# Patient Record
Sex: FEMALE | Race: WHITE | NOT HISPANIC OR LATINO | Employment: PART TIME | URBAN - METROPOLITAN AREA
[De-identification: names, ages, dates, MRNs, and addresses within clinical notes are randomized per-mention and may not be internally consistent; named-entity substitution may affect disease eponyms.]

---

## 2020-01-17 ENCOUNTER — TRANSCRIBE ORDERS (OUTPATIENT)
Dept: ADMINISTRATIVE | Facility: HOSPITAL | Age: 40
End: 2020-01-17

## 2020-01-17 DIAGNOSIS — E04.9 ENLARGEMENT OF THYROID: Primary | ICD-10-CM

## 2020-01-20 ENCOUNTER — HOSPITAL ENCOUNTER (OUTPATIENT)
Dept: RADIOLOGY | Facility: HOSPITAL | Age: 40
Discharge: HOME | End: 2020-01-20
Payer: COMMERCIAL

## 2020-01-20 DIAGNOSIS — E04.9 ENLARGEMENT OF THYROID: ICD-10-CM

## 2020-01-20 PROCEDURE — 76536 US EXAM OF HEAD AND NECK: CPT

## 2021-03-30 DIAGNOSIS — Z23 ENCOUNTER FOR IMMUNIZATION: ICD-10-CM

## 2021-04-08 ENCOUNTER — IMMUNIZATIONS (OUTPATIENT)
Dept: FAMILY MEDICINE CLINIC | Facility: HOSPITAL | Age: 41
End: 2021-04-08

## 2021-04-08 DIAGNOSIS — Z23 ENCOUNTER FOR IMMUNIZATION: Primary | ICD-10-CM

## 2021-04-08 PROCEDURE — 0001A SARS-COV-2 / COVID-19 MRNA VACCINE (PFIZER-BIONTECH) 30 MCG: CPT

## 2021-04-08 PROCEDURE — 91300 SARS-COV-2 / COVID-19 MRNA VACCINE (PFIZER-BIONTECH) 30 MCG: CPT

## 2021-04-09 ENCOUNTER — TRANSCRIBE ORDERS (OUTPATIENT)
Dept: ADMINISTRATIVE | Facility: HOSPITAL | Age: 41
End: 2021-04-09

## 2021-04-09 DIAGNOSIS — R10.84 GENERALIZED ABDOMINAL PAIN: Primary | ICD-10-CM

## 2021-04-15 ENCOUNTER — HOSPITAL ENCOUNTER (OUTPATIENT)
Dept: CT IMAGING | Facility: CLINIC | Age: 41
Discharge: HOME | End: 2021-04-15
Payer: COMMERCIAL

## 2021-04-15 DIAGNOSIS — R10.84 GENERALIZED ABDOMINAL PAIN: ICD-10-CM

## 2021-04-15 PROCEDURE — 74176 CT ABD & PELVIS W/O CONTRAST: CPT

## 2021-04-15 PROCEDURE — G1004 CDSM NDSC: HCPCS

## 2021-04-30 ENCOUNTER — IMMUNIZATIONS (OUTPATIENT)
Dept: FAMILY MEDICINE CLINIC | Facility: HOSPITAL | Age: 41
End: 2021-04-30

## 2021-04-30 DIAGNOSIS — Z23 ENCOUNTER FOR IMMUNIZATION: Primary | ICD-10-CM

## 2021-04-30 PROCEDURE — 0002A SARS-COV-2 / COVID-19 MRNA VACCINE (PFIZER-BIONTECH) 30 MCG: CPT

## 2021-04-30 PROCEDURE — 91300 SARS-COV-2 / COVID-19 MRNA VACCINE (PFIZER-BIONTECH) 30 MCG: CPT

## 2022-03-28 ENCOUNTER — OFFICE VISIT (OUTPATIENT)
Dept: OBGYN CLINIC | Facility: CLINIC | Age: 42
End: 2022-03-28
Payer: COMMERCIAL

## 2022-03-28 VITALS
SYSTOLIC BLOOD PRESSURE: 120 MMHG | WEIGHT: 136 LBS | BODY MASS INDEX: 24.1 KG/M2 | DIASTOLIC BLOOD PRESSURE: 72 MMHG | HEIGHT: 63 IN

## 2022-03-28 DIAGNOSIS — N92.0 EXCESSIVE OR FREQUENT MENSTRUATION: ICD-10-CM

## 2022-03-28 DIAGNOSIS — Z12.31 ENCOUNTER FOR SCREENING MAMMOGRAM FOR MALIGNANT NEOPLASM OF BREAST: ICD-10-CM

## 2022-03-28 DIAGNOSIS — Z12.4 SCREENING FOR MALIGNANT NEOPLASM OF THE CERVIX: Primary | ICD-10-CM

## 2022-03-28 PROCEDURE — G0476 HPV COMBO ASSAY CA SCREEN: HCPCS | Performed by: OBSTETRICS & GYNECOLOGY

## 2022-03-28 PROCEDURE — G0145 SCR C/V CYTO,THINLAYER,RESCR: HCPCS | Performed by: OBSTETRICS & GYNECOLOGY

## 2022-03-28 PROCEDURE — 99386 PREV VISIT NEW AGE 40-64: CPT | Performed by: OBSTETRICS & GYNECOLOGY

## 2022-03-28 RX ORDER — TRANEXAMIC ACID 650 1/1
650 TABLET ORAL 3 TIMES DAILY PRN
Qty: 30 TABLET | Refills: 3 | Status: SHIPPED | OUTPATIENT
Start: 2022-03-28 | End: 2022-07-21

## 2022-03-28 RX ORDER — CETIRIZINE HYDROCHLORIDE 10 MG/1
10 TABLET ORAL EVERY MORNING
COMMUNITY

## 2022-03-28 RX ORDER — GABAPENTIN 300 MG/1
300 CAPSULE ORAL 2 TIMES DAILY
COMMUNITY

## 2022-03-28 RX ORDER — RAMIPRIL 2.5 MG/1
2.5 CAPSULE ORAL EVERY MORNING
COMMUNITY
Start: 2022-03-15

## 2022-03-28 NOTE — PROGRESS NOTES
Assessment/Plan:         Diagnoses and all orders for this visit:    Screening for malignant neoplasm of the cervix  -     Liquid-based pap, screening    Encounter for screening mammogram for malignant neoplasm of breast  -     Mammo screening bilateral w 3d & cad; Future    Excessive or frequent menstruation  -     US pelvis complete non OB; Future          Subjective:      Patient ID: Flor Gordon is a 37 y o  female  The patient is a 60-year-old  3 para 1 female who presents for annual exam and evaluation of increasingly severe menorrhagia  She has flooding type bleeding, soaking her clothes, and spontaneously expelling tampons  Her periods last month was longer than 10 days  She does not have [address]  She does have dysmenorrhea particularly associated with clots  She has completed her childbearing at this point and her  has had a vasectomy  She has no history of abnormal Pap smears but has not had a Pap smear in years  Her examination was normal but we will send her for an ultrasound  We also will give her a prescription of tranexamic acid until the testing is done and we can re-evaluate her options  Her other major medical problem is diabetes, which is well controlled  She was given a prescription for mammogram, and will obtain the ultrasound of the pelvis  The following portions of the patient's history were reviewed and updated as appropriate: allergies, current medications, past family history, past medical history, past social history, past surgical history and problem list     Review of Systems   Constitutional: Negative for chills, diaphoresis, fatigue, fever and unexpected weight change  HENT: Negative for congestion, sinus pressure, sinus pain, tinnitus and trouble swallowing  Eyes: Negative for visual disturbance  Respiratory: Negative for cough, chest tightness and shortness of breath      Cardiovascular: Negative for chest pain, palpitations and leg swelling  Gastrointestinal: Negative for abdominal distention, abdominal pain, anal bleeding, constipation, diarrhea, nausea, rectal pain and vomiting  Endocrine: Negative for heat intolerance  Genitourinary: Negative for difficulty urinating, dysuria, flank pain, frequency, genital sores, hematuria and urgency  Musculoskeletal: Negative for arthralgias, back pain and joint swelling  Skin: Negative for rash  Allergic/Immunologic: Negative for environmental allergies and food allergies  Neurological: Negative for headaches  Hematological: Negative for adenopathy  Does not bruise/bleed easily  Psychiatric/Behavioral: Negative for decreased concentration and dysphoric mood  The patient is not nervous/anxious            Objective:      Ht 5' 3" (1 6 m)   Wt 61 7 kg (136 lb)   LMP 03/14/2022   BMI 24 09 kg/m²          Physical Exam

## 2022-03-30 LAB
HPV HR 12 DNA CVX QL NAA+PROBE: NEGATIVE
HPV16 DNA CVX QL NAA+PROBE: NEGATIVE
HPV18 DNA CVX QL NAA+PROBE: NEGATIVE

## 2022-04-06 LAB
LAB AP GYN PRIMARY INTERPRETATION: NORMAL
Lab: NORMAL
PATH INTERP SPEC-IMP: NORMAL

## 2022-04-08 ENCOUNTER — HOSPITAL ENCOUNTER (OUTPATIENT)
Dept: RADIOLOGY | Facility: HOSPITAL | Age: 42
Discharge: HOME | End: 2022-04-08
Payer: COMMERCIAL

## 2022-04-08 DIAGNOSIS — N92.0 EXCESSIVE OR FREQUENT MENSTRUATION: ICD-10-CM

## 2022-04-08 PROCEDURE — 76830 TRANSVAGINAL US NON-OB: CPT

## 2022-04-08 PROCEDURE — 76856 US EXAM PELVIC COMPLETE: CPT

## 2022-04-18 ENCOUNTER — OFFICE VISIT (OUTPATIENT)
Dept: OBGYN CLINIC | Facility: CLINIC | Age: 42
End: 2022-04-18
Payer: COMMERCIAL

## 2022-04-18 VITALS
WEIGHT: 139 LBS | HEIGHT: 63 IN | BODY MASS INDEX: 24.63 KG/M2 | DIASTOLIC BLOOD PRESSURE: 72 MMHG | SYSTOLIC BLOOD PRESSURE: 118 MMHG

## 2022-04-18 DIAGNOSIS — N92.0 MENORRHAGIA WITH REGULAR CYCLE: Primary | ICD-10-CM

## 2022-04-18 PROCEDURE — 99214 OFFICE O/P EST MOD 30 MIN: CPT | Performed by: OBSTETRICS & GYNECOLOGY

## 2022-04-18 NOTE — PROGRESS NOTES
Assessment/Plan:         Diagnoses and all orders for this visit:    Menorrhagia with regular cycle          Subjective:      Patient ID: Galen Lam is a 37 y o  female  Patient is a 45-year-old  3 para 3 female with increasingly severe menorrhagia  She has no intermenstrual bleeding but has flooding type bleeding with her periods  She frequently bleeds through any type of protection and the exact timing of the heavy this of the periods is unpredictable  It could be in the beginning or the and  She has never bled between periods  A recent ultrasound showed possible polyp in the endometrial canal   The bleeding pattern that she has had does not correspond to a polyp  The remainder of the ultrasound was normal   Option risks and benefits were discussed  She is requesting hysterectomy  Another alternative would be endometrial ablation and hysteroscopic polypectomy  She is concerned that the endometrial ablation would not necessarily last until menopause in her particular case  She does not desire future childbearing  Her  had a vasectomy 13 years ago  I agree with her decision to proceed with hysterectomy  It could be performed laparoscopically  In terms of previous surgery, she has only had a cholecystectomy  We will proceed with scheduling the surgery  Her Pap smear was normal several weeks ago  The following portions of the patient's history were reviewed and updated as appropriate: allergies, current medications, past family history, past medical history, past social history, past surgical history and problem list     Review of Systems   Constitutional: Negative for chills, diaphoresis, fatigue, fever and unexpected weight change  HENT: Negative for congestion, sinus pressure, sinus pain, tinnitus and trouble swallowing  Eyes: Negative for visual disturbance  Respiratory: Negative for cough, chest tightness and shortness of breath      Cardiovascular: Negative for chest pain, palpitations and leg swelling  Gastrointestinal: Negative for abdominal distention, abdominal pain, anal bleeding, constipation, diarrhea, nausea, rectal pain and vomiting  Endocrine: Negative for heat intolerance  Genitourinary: Negative for difficulty urinating, dysuria, flank pain, frequency, genital sores, hematuria and urgency  Musculoskeletal: Negative for arthralgias, back pain and joint swelling  Skin: Negative for rash  Allergic/Immunologic: Negative for environmental allergies and food allergies  Neurological: Negative for headaches  Hematological: Negative for adenopathy  Does not bruise/bleed easily  Psychiatric/Behavioral: Negative for decreased concentration and dysphoric mood  The patient is not nervous/anxious  Objective:      /72 (BP Location: Left arm)   Ht 5' 3" (1 6 m)   Wt 63 kg (139 lb)   LMP 04/17/2022   BMI 24 62 kg/m²          Physical Exam  Vitals and nursing note reviewed  Exam conducted with a chaperone present  Constitutional:       Appearance: Normal appearance  She is normal weight  HENT:      Head: Normocephalic and atraumatic  Nose: Nose normal    Eyes:      Conjunctiva/sclera: Conjunctivae normal    Pulmonary:      Effort: Pulmonary effort is normal    Abdominal:      General: Abdomen is flat  Palpations: Abdomen is soft  Musculoskeletal:         General: Normal range of motion  Skin:     General: Skin is warm and dry  Neurological:      General: No focal deficit present  Mental Status: She is alert  Mental status is at baseline  Psychiatric:         Mood and Affect: Mood normal          Behavior: Behavior normal          Thought Content:  Thought content normal          Judgment: Judgment normal

## 2022-04-29 ENCOUNTER — HOSPITAL ENCOUNTER (OUTPATIENT)
Dept: RADIOLOGY | Facility: HOSPITAL | Age: 42
Discharge: HOME | End: 2022-04-29
Payer: COMMERCIAL

## 2022-04-29 VITALS — WEIGHT: 135 LBS | BODY MASS INDEX: 23.92 KG/M2 | HEIGHT: 63 IN

## 2022-04-29 DIAGNOSIS — Z12.31 ENCOUNTER FOR SCREENING MAMMOGRAM FOR MALIGNANT NEOPLASM OF BREAST: ICD-10-CM

## 2022-04-29 PROCEDURE — 77067 SCR MAMMO BI INCL CAD: CPT

## 2022-04-29 PROCEDURE — 77063 BREAST TOMOSYNTHESIS BI: CPT

## 2022-05-04 DIAGNOSIS — R92.8 ABNORMAL MAMMOGRAM OF LEFT BREAST: Primary | ICD-10-CM

## 2022-05-12 ENCOUNTER — HOSPITAL ENCOUNTER (OUTPATIENT)
Dept: RADIOLOGY | Facility: HOSPITAL | Age: 42
Discharge: HOME | End: 2022-05-12
Payer: COMMERCIAL

## 2022-05-12 DIAGNOSIS — R92.8 ABNORMAL MAMMOGRAM OF LEFT BREAST: ICD-10-CM

## 2022-05-12 DIAGNOSIS — E11.65 UNCONTROLLED TYPE 2 DIABETES MELLITUS WITH HYPERGLYCEMIA (HCC): Primary | ICD-10-CM

## 2022-05-12 PROCEDURE — 76642 ULTRASOUND BREAST LIMITED: CPT

## 2022-05-23 ENCOUNTER — HOSPITAL ENCOUNTER (OUTPATIENT)
Dept: RADIOLOGY | Facility: HOSPITAL | Age: 42
Discharge: HOME | End: 2022-05-23

## 2022-05-23 ENCOUNTER — HOSPITAL ENCOUNTER (OUTPATIENT)
Dept: RADIOLOGY | Facility: HOSPITAL | Age: 42
Discharge: HOME | End: 2022-05-23
Payer: COMMERCIAL

## 2022-05-23 ENCOUNTER — OFFICE VISIT (OUTPATIENT)
Dept: LAB | Facility: HOSPITAL | Age: 42
End: 2022-05-23
Payer: COMMERCIAL

## 2022-05-23 VITALS — DIASTOLIC BLOOD PRESSURE: 76 MMHG | SYSTOLIC BLOOD PRESSURE: 128 MMHG

## 2022-05-23 DIAGNOSIS — R92.8 ABNORMAL FINDINGS ON DIAGNOSTIC IMAGING OF BREAST: ICD-10-CM

## 2022-05-23 DIAGNOSIS — Z01.818 PRE-OP EXAM: ICD-10-CM

## 2022-05-23 LAB
ATRIAL RATE: 90 BPM
P AXIS: 70 DEGREES
PR INTERVAL: 128 MS
QRS AXIS: 59 DEGREES
QRSD INTERVAL: 70 MS
QT INTERVAL: 376 MS
QTC INTERVAL: 459 MS
T WAVE AXIS: 37 DEGREES
VENTRICULAR RATE: 90 BPM

## 2022-05-23 PROCEDURE — 88305 TISSUE EXAM BY PATHOLOGIST: CPT | Performed by: PATHOLOGY

## 2022-05-23 PROCEDURE — 93005 ELECTROCARDIOGRAM TRACING: CPT

## 2022-05-23 PROCEDURE — 19083 BX BREAST 1ST LESION US IMAG: CPT

## 2022-05-23 PROCEDURE — 93010 ELECTROCARDIOGRAM REPORT: CPT | Performed by: INTERNAL MEDICINE

## 2022-05-23 PROCEDURE — A4648 IMPLANTABLE TISSUE MARKER: HCPCS

## 2022-05-23 RX ORDER — LIDOCAINE HYDROCHLORIDE 10 MG/ML
5 INJECTION, SOLUTION EPIDURAL; INFILTRATION; INTRACAUDAL; PERINEURAL ONCE
Status: COMPLETED | OUTPATIENT
Start: 2022-05-23 | End: 2022-05-23

## 2022-05-23 RX ADMIN — LIDOCAINE HYDROCHLORIDE 3 ML: 10 INJECTION, SOLUTION EPIDURAL; INFILTRATION; INTRACAUDAL; PERINEURAL at 08:24

## 2022-05-23 NOTE — DISCHARGE INSTR - OTHER ORDERS
POST LARGE CORE BREAST BIOPSY PATIENT INFORMATION      Place an ice pack inside your bra over the top of the dressing every hour for 20 minutes (20 minutes on, 60 minutes off)  Do this until bedtime  Do not shower or bathe until the following morning  You may bathe your breast carefully with the steri-strips in place  Be careful    Not to loosen them  The steri-strips will fall off in 3-5 days  You may have mild discomfort, and you may have some bruising where the   Needle entered the skin  This should clear within 5-7 days  If you need medicine for discomfort, take acetaminophen products such as   Tylenol  You may also take Advil or Motrin products  Do not participate in strenuous activities such as-tennis, aerobics, skiing,  Weight lifting, etc  for 24 hours  Refrain from swimming/soaking for 72 hours  Wearing a bra for sleeping may be more comfortable for the first 24-48 hours  Watch for continued bleeding, pain or fever over 101  If any of these symptoms occur, please contact our    breast nurse navigator at the location where your biopsy was performed  During normal business hours (7:30 am-4:00 pm) please call the nurse navigator at the site where your   procedure was performed:    [address] [facility] [address]: [phone] or   [address]: [phone] or [phone]  [address]: [address] [place]: [address]: [phone]              After 4 PM - please call your physician or go to the nearest Emergency Department location  9          The final results of your biopsy are usually available within one week

## 2022-05-23 NOTE — PROGRESS NOTES
Patient arrived via:    __x___ ambulatory    _____ wheelchair    _____ stretcher      Domestic violence screen    __x___ negative _____ positive      Breast Implants:    ______ yes ___x___ no

## 2022-05-23 NOTE — PROGRESS NOTES
Procedure type:    __x___ Ultrasound guided _____ Stereotactic    Breast:    ___x__ Left breast biopsy  _____ Right breast biopsy    Time-out called @ 08:21 and procedure confirmed with patient Zoraida Smyth, myself Juma Cavazos RN, Ca Guillory MD, and [address] [facility]      Location: Left Breast 3 o'clock retroareolar    Needle: 12G janett    # of passes: 2    Clip: Wing biopsy marker    Performed by: Ca Guillory MD    Pressure held for 5 minutes by: Juma Cavazos RN    Steri Strips:    __x___ yes _____ no    Band aid:    _____ yes __x___ no   Gauze and tape in place    Tolerated procedure:    __x___ yes _____ no

## 2022-05-24 ENCOUNTER — TELEPHONE (OUTPATIENT)
Dept: RADIOLOGY | Facility: HOSPITAL | Age: 42
End: 2022-05-24

## 2022-05-25 ENCOUNTER — TELEPHONE (OUTPATIENT)
Dept: RADIOLOGY | Facility: HOSPITAL | Age: 42
End: 2022-05-25

## 2022-05-26 ENCOUNTER — ANESTHESIA EVENT (OUTPATIENT)
Dept: PERIOP | Facility: HOSPITAL | Age: 42
End: 2022-05-26
Payer: COMMERCIAL

## 2022-06-01 ENCOUNTER — APPOINTMENT (OUTPATIENT)
Dept: LAB | Facility: CLINIC | Age: 42
End: 2022-06-01
Payer: COMMERCIAL

## 2022-06-01 ENCOUNTER — APPOINTMENT (OUTPATIENT)
Dept: LAB | Facility: HOSPITAL | Age: 42
End: 2022-06-01
Payer: COMMERCIAL

## 2022-06-01 DIAGNOSIS — Z01.818 PRE-OP EXAM: ICD-10-CM

## 2022-06-01 DIAGNOSIS — N92.0 EXCESSIVE OR FREQUENT MENSTRUATION: Primary | ICD-10-CM

## 2022-06-01 PROCEDURE — 86901 BLOOD TYPING SEROLOGIC RH(D): CPT

## 2022-06-01 PROCEDURE — 86900 BLOOD TYPING SEROLOGIC ABO: CPT

## 2022-06-01 PROCEDURE — 86850 RBC ANTIBODY SCREEN: CPT

## 2022-06-01 PROCEDURE — 36415 COLL VENOUS BLD VENIPUNCTURE: CPT

## 2022-06-01 RX ORDER — FLUTICASONE PROPIONATE 50 MCG
1 SPRAY, SUSPENSION (ML) NASAL AS NEEDED
COMMUNITY

## 2022-06-01 NOTE — PRE-PROCEDURE INSTRUCTIONS
Pre-Surgery Instructions:   Medication Instructions    cetirizine (ZyrTEC) 10 mg tablet Uses PRN- OK to take day of surgery    fluticasone (FLONASE) 50 mcg/act nasal spray Uses PRN- OK to take day of surgery    gabapentin (NEURONTIN) 300 mg capsule Take day of surgery   Insulin Glargine (BASAGLAR KWIKPEN SC) Hold day of surgery   ramipril (ALTACE) 2 5 mg capsule Hold day of surgery   sertraline (ZOLOFT) 50 mg tablet Take day of surgery   Tranexamic Acid 650 MG TABS Uses PRN- DO NOT take day of surgery   Pre op and bathing instructions reviewed  Pt has hibiclens  Pt  Verbalized understanding of current visitor restrictions  Pt  Verbalized an understanding of all instructions reviewed and offers no concerns at this time  Instructed to avoid all ASA/NSAIDs and OTC Vit/Supp from now until after surgery per anesthesia guidelines   Tylenol ok prn  DOS meds with a few sips of H2O

## 2022-06-02 LAB
ABO GROUP BLD: NORMAL
BLD GP AB SCN SERPL QL: NEGATIVE
RH BLD: POSITIVE
SPECIMEN EXPIRATION DATE: NORMAL

## 2022-06-06 ENCOUNTER — ANESTHESIA (OUTPATIENT)
Dept: PERIOP | Facility: HOSPITAL | Age: 42
End: 2022-06-06
Payer: COMMERCIAL

## 2022-06-08 DIAGNOSIS — Z01.818 PRE-OP TESTING: Primary | ICD-10-CM

## 2022-07-07 PROCEDURE — NC001 PR NO CHARGE: Performed by: OBSTETRICS & GYNECOLOGY

## 2022-07-07 NOTE — H&P
Patient is a 72-year-old  3 para 3 female with increasingly severe menorrhagia  She has no intermenstrual bleeding but has flooding type bleeding with her periods  She frequently bleeds through any type of protection and the exact timing of the heavy this of the periods is unpredictable  It could be in the beginning or the and  She has never bled between periods  A recent ultrasound showed possible polyp in the endometrial canal   The bleeding pattern that she has had does not correspond to a polyp  The remainder of the ultrasound was normal   Option risks and benefits were discussed  She is requesting hysterectomy  Another alternative would be endometrial ablation and hysteroscopic polypectomy  She is concerned that the endometrial ablation would not necessarily last until menopause in her particular case  She does not desire future childbearing  Her  had a vasectomy 13 years ago  I agree with her decision to proceed with hysterectomy  It could be performed laparoscopically  In terms of previous surgery, she has only had a cholecystectomy  We will proceed with scheduling the surgery  Her Pap smear was normal several weeks ago  The following portions of the patient's history were reviewed and updated as appropriate: allergies, current medications, past family history, past medical history, past social history, past surgical history and problem list     Objective:        /72 (BP Location: Left arm)   Ht 5' 3" (1 6 m)   Wt 63 kg (139 lb)   LMP 2022   BMI 24 62 kg/m²             Physical Exam  Vitals and nursing note reviewed  Exam conducted with a chaperone present  Constitutional:       Appearance: Normal appearance  She is normal weight  HENT:      Head: Normocephalic and atraumatic        Nose: Nose normal    Eyes:      Conjunctiva/sclera: Conjunctivae normal    Pulmonary:      Effort: Pulmonary effort is normal  Chest clear to a and p  Car - RRR, no murmur  Abdominal:      General: Abdomen is flat  Palpations: Abdomen is soft  Musculoskeletal:         General: Normal range of motion  Skin:     General: Skin is warm and dry  Neurological:      General: No focal deficit present  Mental Status: She is alert  Mental status is at baseline  Psychiatric:         Mood and Affect: Mood normal          Behavior: Behavior normal          Thought Content:  Thought content normal          Judgment: Judgment normal

## 2022-07-10 PROBLEM — E10.9 DIABETES MELLITUS TYPE 1 (HCC): Status: ACTIVE | Noted: 2022-07-10

## 2022-07-10 PROBLEM — I10 HTN (HYPERTENSION): Status: ACTIVE | Noted: 2022-07-10

## 2022-07-10 NOTE — ANESTHESIA PREPROCEDURE EVALUATION
Procedure:  HYSTERECTOMY LAPAROSCOPIC ASSISTED VAGINAL (LAVH) (N/A Abdomen)    Relevant Problems   CARDIO   (+) HTN (hypertension)      ENDO   (+) Diabetes mellitus type 1 ([facility] [address] )      COVID+ in mid May 2022:  +smoker  +1-4 alcoholic drinks a day  Physical Exam    Airway    Mallampati score: II  TM Distance: >3 FB  Neck ROM: full     Dental       Cardiovascular      Pulmonary      Other Findings        EKG (5/23/22)  Normal sinus rhythm  Normal ECG  No previous ECGs available    Anesthesia Plan  ASA Score- 2     Anesthesia Type- general with ASA Monitors  Additional Monitors:   Airway Plan: ETT  Comment: NPO after: MN    Urine hcg = negative    7 units of insulin (regular) IV for blood sugar of 261  Plan Factors-Exercise tolerance (METS): >4 METS  Chart reviewed  EKG reviewed  Patient summary reviewed  Patient is a current smoker  Patient instructed to abstain from smoking on day of procedure  Patient smoked on day of surgery  Induction- intravenous  Postoperative Plan- Plan for postoperative opioid use  Planned trial extubation    Informed Consent- Anesthetic plan and risks discussed with patient and spouse  I personally reviewed this patient with the CRNA  Discussed and agreed on the Anesthesia Plan with the MONSERRAT Singh

## 2022-07-11 ENCOUNTER — HOSPITAL ENCOUNTER (OUTPATIENT)
Facility: HOSPITAL | Age: 42
Setting detail: OUTPATIENT SURGERY
Discharge: HOME | End: 2022-07-11
Attending: OBSTETRICS & GYNECOLOGY | Admitting: OBSTETRICS & GYNECOLOGY
Payer: COMMERCIAL

## 2022-07-11 VITALS
TEMPERATURE: 98 F | OXYGEN SATURATION: 99 % | BODY MASS INDEX: 24.27 KG/M2 | SYSTOLIC BLOOD PRESSURE: 124 MMHG | WEIGHT: 137 LBS | RESPIRATION RATE: 16 BRPM | HEART RATE: 84 BPM | HEIGHT: 63 IN | DIASTOLIC BLOOD PRESSURE: 60 MMHG

## 2022-07-11 DIAGNOSIS — N92.0 EXCESSIVE OR FREQUENT MENSTRUATION: ICD-10-CM

## 2022-07-11 DIAGNOSIS — Z90.710 S/P LAPAROSCOPIC ASSISTED VAGINAL HYSTERECTOMY (LAVH): Primary | ICD-10-CM

## 2022-07-11 LAB
ABO GROUP BLD: NORMAL
BLD GP AB SCN SERPL QL: NEGATIVE
EXT PREGNANCY TEST URINE: NEGATIVE
EXT. CONTROL: NORMAL
GLUCOSE SERPL-MCNC: 205 MG/DL (ref 65–140)
GLUCOSE SERPL-MCNC: 261 MG/DL (ref 65–140)
RH BLD: POSITIVE
SPECIMEN EXPIRATION DATE: NORMAL

## 2022-07-11 PROCEDURE — 86850 RBC ANTIBODY SCREEN: CPT | Performed by: OBSTETRICS & GYNECOLOGY

## 2022-07-11 PROCEDURE — 82948 REAGENT STRIP/BLOOD GLUCOSE: CPT

## 2022-07-11 PROCEDURE — 86900 BLOOD TYPING SEROLOGIC ABO: CPT | Performed by: OBSTETRICS & GYNECOLOGY

## 2022-07-11 PROCEDURE — 58552 LAPARO-VAG HYST INCL T/O: CPT | Performed by: OBSTETRICS & GYNECOLOGY

## 2022-07-11 PROCEDURE — 81025 URINE PREGNANCY TEST: CPT | Performed by: OBSTETRICS & GYNECOLOGY

## 2022-07-11 PROCEDURE — 86901 BLOOD TYPING SEROLOGIC RH(D): CPT | Performed by: OBSTETRICS & GYNECOLOGY

## 2022-07-11 PROCEDURE — 88307 TISSUE EXAM BY PATHOLOGIST: CPT | Performed by: PATHOLOGY

## 2022-07-11 RX ORDER — DEXAMETHASONE SODIUM PHOSPHATE 10 MG/ML
INJECTION, SOLUTION INTRAMUSCULAR; INTRAVENOUS AS NEEDED
Status: DISCONTINUED | OUTPATIENT
Start: 2022-07-11 | End: 2022-07-11

## 2022-07-11 RX ORDER — PROPOFOL 10 MG/ML
INJECTION, EMULSION INTRAVENOUS CONTINUOUS PRN
Status: DISCONTINUED | OUTPATIENT
Start: 2022-07-11 | End: 2022-07-11

## 2022-07-11 RX ORDER — MIDAZOLAM HYDROCHLORIDE 2 MG/2ML
INJECTION, SOLUTION INTRAMUSCULAR; INTRAVENOUS AS NEEDED
Status: DISCONTINUED | OUTPATIENT
Start: 2022-07-11 | End: 2022-07-11

## 2022-07-11 RX ORDER — SODIUM CHLORIDE 9 MG/ML
INJECTION, SOLUTION INTRAVENOUS CONTINUOUS PRN
Status: DISCONTINUED | OUTPATIENT
Start: 2022-07-11 | End: 2022-07-11

## 2022-07-11 RX ORDER — FENTANYL CITRATE/PF 50 MCG/ML
25 SYRINGE (ML) INJECTION
Status: DISCONTINUED | OUTPATIENT
Start: 2022-07-11 | End: 2022-07-11 | Stop reason: HOSPADM

## 2022-07-11 RX ORDER — BUPIVACAINE HYDROCHLORIDE 2.5 MG/ML
INJECTION, SOLUTION EPIDURAL; INFILTRATION; INTRACAUDAL AS NEEDED
Status: DISCONTINUED | OUTPATIENT
Start: 2022-07-11 | End: 2022-07-11 | Stop reason: HOSPADM

## 2022-07-11 RX ORDER — ROCURONIUM BROMIDE 10 MG/ML
INJECTION, SOLUTION INTRAVENOUS AS NEEDED
Status: DISCONTINUED | OUTPATIENT
Start: 2022-07-11 | End: 2022-07-11

## 2022-07-11 RX ORDER — HYDROMORPHONE HCL 110MG/55ML
PATIENT CONTROLLED ANALGESIA SYRINGE INTRAVENOUS AS NEEDED
Status: DISCONTINUED | OUTPATIENT
Start: 2022-07-11 | End: 2022-07-11

## 2022-07-11 RX ORDER — HYDROMORPHONE HCL/PF 1 MG/ML
0.2 SYRINGE (ML) INJECTION
Status: DISCONTINUED | OUTPATIENT
Start: 2022-07-11 | End: 2022-07-11 | Stop reason: HOSPADM

## 2022-07-11 RX ORDER — NEOSTIGMINE METHYLSULFATE 1 MG/ML
INJECTION INTRAVENOUS AS NEEDED
Status: DISCONTINUED | OUTPATIENT
Start: 2022-07-11 | End: 2022-07-11

## 2022-07-11 RX ORDER — METOCLOPRAMIDE HYDROCHLORIDE 5 MG/ML
10 INJECTION INTRAMUSCULAR; INTRAVENOUS ONCE AS NEEDED
Status: DISCONTINUED | OUTPATIENT
Start: 2022-07-11 | End: 2022-07-11 | Stop reason: HOSPADM

## 2022-07-11 RX ORDER — GLYCOPYRROLATE 0.2 MG/ML
INJECTION INTRAMUSCULAR; INTRAVENOUS AS NEEDED
Status: DISCONTINUED | OUTPATIENT
Start: 2022-07-11 | End: 2022-07-11

## 2022-07-11 RX ORDER — LIDOCAINE HYDROCHLORIDE 10 MG/ML
INJECTION, SOLUTION EPIDURAL; INFILTRATION; INTRACAUDAL; PERINEURAL AS NEEDED
Status: DISCONTINUED | OUTPATIENT
Start: 2022-07-11 | End: 2022-07-11

## 2022-07-11 RX ORDER — EPHEDRINE SULFATE 50 MG/ML
INJECTION INTRAVENOUS AS NEEDED
Status: DISCONTINUED | OUTPATIENT
Start: 2022-07-11 | End: 2022-07-11

## 2022-07-11 RX ORDER — FENTANYL CITRATE 50 UG/ML
INJECTION, SOLUTION INTRAMUSCULAR; INTRAVENOUS AS NEEDED
Status: DISCONTINUED | OUTPATIENT
Start: 2022-07-11 | End: 2022-07-11

## 2022-07-11 RX ORDER — ONDANSETRON 2 MG/ML
4 INJECTION INTRAMUSCULAR; INTRAVENOUS ONCE AS NEEDED
Status: DISCONTINUED | OUTPATIENT
Start: 2022-07-11 | End: 2022-07-11 | Stop reason: HOSPADM

## 2022-07-11 RX ORDER — SODIUM CHLORIDE, SODIUM LACTATE, POTASSIUM CHLORIDE, CALCIUM CHLORIDE 600; 310; 30; 20 MG/100ML; MG/100ML; MG/100ML; MG/100ML
INJECTION, SOLUTION INTRAVENOUS CONTINUOUS PRN
Status: DISCONTINUED | OUTPATIENT
Start: 2022-07-11 | End: 2022-07-11

## 2022-07-11 RX ORDER — ONDANSETRON 2 MG/ML
INJECTION INTRAMUSCULAR; INTRAVENOUS AS NEEDED
Status: DISCONTINUED | OUTPATIENT
Start: 2022-07-11 | End: 2022-07-11

## 2022-07-11 RX ORDER — CEFAZOLIN SODIUM 1 G/3ML
INJECTION, POWDER, FOR SOLUTION INTRAMUSCULAR; INTRAVENOUS AS NEEDED
Status: DISCONTINUED | OUTPATIENT
Start: 2022-07-11 | End: 2022-07-11

## 2022-07-11 RX ORDER — DEXTROSE MONOHYDRATE 25 G/50ML
INJECTION, SOLUTION INTRAVENOUS AS NEEDED
Status: DISCONTINUED | OUTPATIENT
Start: 2022-07-11 | End: 2022-07-11

## 2022-07-11 RX ORDER — SODIUM CHLORIDE, SODIUM LACTATE, POTASSIUM CHLORIDE, CALCIUM CHLORIDE 600; 310; 30; 20 MG/100ML; MG/100ML; MG/100ML; MG/100ML
75 INJECTION, SOLUTION INTRAVENOUS CONTINUOUS
Status: DISCONTINUED | OUTPATIENT
Start: 2022-07-11 | End: 2022-07-21 | Stop reason: HOSPADM

## 2022-07-11 RX ORDER — OXYCODONE HYDROCHLORIDE AND ACETAMINOPHEN 5; 325 MG/1; MG/1
1 TABLET ORAL EVERY 4 HOURS PRN
Qty: 30 TABLET | Refills: 0 | Status: SHIPPED | OUTPATIENT
Start: 2022-07-11 | End: 2022-07-21

## 2022-07-11 RX ORDER — IBUPROFEN 600 MG/1
600 TABLET ORAL EVERY 6 HOURS PRN
Qty: 30 TABLET | Refills: 0 | Status: SHIPPED | OUTPATIENT
Start: 2022-07-11 | End: 2022-07-18

## 2022-07-11 RX ORDER — PROPOFOL 10 MG/ML
INJECTION, EMULSION INTRAVENOUS AS NEEDED
Status: DISCONTINUED | OUTPATIENT
Start: 2022-07-11 | End: 2022-07-11

## 2022-07-11 RX ORDER — ALBUTEROL SULFATE 2.5 MG/3ML
2.5 SOLUTION RESPIRATORY (INHALATION) ONCE AS NEEDED
Status: DISCONTINUED | OUTPATIENT
Start: 2022-07-11 | End: 2022-07-11 | Stop reason: HOSPADM

## 2022-07-11 RX ADMIN — LIDOCAINE HYDROCHLORIDE 50 MG: 10 INJECTION, SOLUTION EPIDURAL; INFILTRATION; INTRACAUDAL; PERINEURAL at 10:02

## 2022-07-11 RX ADMIN — EPHEDRINE SULFATE 5 MG: 50 INJECTION, SOLUTION INTRAVENOUS at 11:53

## 2022-07-11 RX ADMIN — SODIUM CHLORIDE, SODIUM LACTATE, POTASSIUM CHLORIDE, AND CALCIUM CHLORIDE: .6; .31; .03; .02 INJECTION, SOLUTION INTRAVENOUS at 11:05

## 2022-07-11 RX ADMIN — ROCURONIUM BROMIDE 5 MG: 50 INJECTION, SOLUTION INTRAVENOUS at 12:01

## 2022-07-11 RX ADMIN — CEFAZOLIN SODIUM 2000 MG: 1 INJECTION, POWDER, FOR SOLUTION INTRAMUSCULAR; INTRAVENOUS at 10:16

## 2022-07-11 RX ADMIN — ONDANSETRON 4 MG: 2 INJECTION INTRAMUSCULAR; INTRAVENOUS at 12:02

## 2022-07-11 RX ADMIN — PROPOFOL 200 MG: 10 INJECTION, EMULSION INTRAVENOUS at 10:02

## 2022-07-11 RX ADMIN — PROPOFOL 50 MCG/KG/MIN: 10 INJECTION, EMULSION INTRAVENOUS at 10:15

## 2022-07-11 RX ADMIN — SODIUM CHLORIDE: 0.9 INJECTION, SOLUTION INTRAVENOUS at 10:05

## 2022-07-11 RX ADMIN — FENTANYL CITRATE 50 MCG: 50 INJECTION, SOLUTION INTRAMUSCULAR; INTRAVENOUS at 10:24

## 2022-07-11 RX ADMIN — GLYCOPYRROLATE 0.4 MG: 0.2 INJECTION, SOLUTION INTRAMUSCULAR; INTRAVENOUS at 12:07

## 2022-07-11 RX ADMIN — NEOSTIGMINE METHYLSULFATE 3 MG: 1 INJECTION, SOLUTION INTRAVENOUS at 12:08

## 2022-07-11 RX ADMIN — SODIUM CHLORIDE, SODIUM LACTATE, POTASSIUM CHLORIDE, AND CALCIUM CHLORIDE: .6; .31; .03; .02 INJECTION, SOLUTION INTRAVENOUS at 09:55

## 2022-07-11 RX ADMIN — HYDROMORPHONE HYDROCHLORIDE 0.5 MG: 2 INJECTION, SOLUTION INTRAMUSCULAR; INTRAVENOUS; SUBCUTANEOUS at 11:24

## 2022-07-11 RX ADMIN — FENTANYL CITRATE 50 MCG: 50 INJECTION, SOLUTION INTRAMUSCULAR; INTRAVENOUS at 10:02

## 2022-07-11 RX ADMIN — ROCURONIUM BROMIDE 50 MG: 50 INJECTION, SOLUTION INTRAVENOUS at 10:03

## 2022-07-11 RX ADMIN — DEXAMETHASONE SODIUM PHOSPHATE 5 MG: 10 INJECTION INTRAMUSCULAR; INTRAVENOUS at 10:03

## 2022-07-11 RX ADMIN — HYDROMORPHONE HYDROCHLORIDE 0.5 MG: 2 INJECTION, SOLUTION INTRAMUSCULAR; INTRAVENOUS; SUBCUTANEOUS at 12:06

## 2022-07-11 RX ADMIN — DEXTROSE MONOHYDRATE 12.5 G: 25 INJECTION, SOLUTION INTRAVENOUS at 11:09

## 2022-07-11 RX ADMIN — ROCURONIUM BROMIDE 10 MG: 50 INJECTION, SOLUTION INTRAVENOUS at 11:08

## 2022-07-11 RX ADMIN — INSULIN HUMAN 7 UNITS: 100 INJECTION, SOLUTION PARENTERAL at 09:37

## 2022-07-11 RX ADMIN — MIDAZOLAM 2 MG: 1 INJECTION INTRAMUSCULAR; INTRAVENOUS at 09:55

## 2022-07-11 NOTE — ANESTHESIA POSTPROCEDURE EVALUATION
Post-Op Assessment Note    CV Status:  Stable    Pain management: adequate     Mental Status:  Alert and awake   Hydration Status:  Euvolemic   PONV Controlled:  Controlled   Airway Patency:  Patent      Post Op Vitals Reviewed: Yes      Staff: CRNA         No complications documented      BP   135/60   Temp   97 7   Pulse  97   Resp   16   SpO2   100%

## 2022-07-11 NOTE — OP NOTE
OPERATIVE REPORT  PATIENT NAME: Rylie Rodriguez    :  1980  MRN: [id number]  Pt Location: [facility] OR ROOM 02    SURGERY DATE: 2022    Surgeon(s) and Role:     * Araceli Szymanski MD - Primary     * Susi Zimmerman MD - Assisting    Preop Diagnosis:  Excessive or frequent menstruation [N92 0]    Post-Op Diagnosis Codes:     * Excessive or frequent menstruation [N92 0]    Procedure(s) (LRB):  HYSTERECTOMY LAPAROSCOPIC ASSISTED VAGINAL (LAVH) (N/A)    Specimen(s):  ID Type Source Tests Collected by Time Destination   1 : uterus and cervix Tissue Uterus TISSUE EXAM Araceli Szymanski MD 2022 1205    2 : bilateral tubes Tissue Fallopian Tubes, Bilateral TISSUE EXAM Araceli Szymanski MD 2022 1209        Estimated Blood Loss:   100 mL    Drains:  NG/OG/Enteral Tube Orogastric 18 Fr Center mouth (Active)   Number of days: 0       [REMOVED] Urethral Catheter Double-lumen 16 Fr  (Removed)   Number of days: 0       Anesthesia Type:   General    Operative Indications:  Excessive or frequent menstruation [N92 0]  Pelvic pain     Operative Findings:  Normal uterus, tubes and ovaries; bleeding heavily vaginally prior to the start of the case  Complications:   None    Procedure and Technique:  Patient was identified by the attending surgeon  She was taken into the operating room and general anesthesia was induced  She was placed in the dorsal lithotomy position and prepped and draped in the usual manner for combined vaginal abdominal procedure  The KRISTIN device was placed in the vagina following the insertion of a Zimmerman catheter  The anterior lip and posterior lip of the cervix had a stitch placed which allowed us to connect the KRISTIN  The surgeon changed gloves and proceeded to the abdomen where an incision was made [facility] [address] [facility] and the trocar was inserted under direct visualization  No adhesions were noted  An umbilical port was then placed to accommodate a 10   Trocar and 2 5 trocars were placed flat orally  Inspection was made with findings noted above  The right tube was grasped in the fimbriated portion with a bowel grasper and the LigaSure was used to remove the tube by coagulating and cutting the mesosalpinx  The ovarian uterine ligament was then grasped and cut  The LigaSure was then marched down to the round ligament which was incised and the bladder flap was developed  The left side was treated in exactly the same manner  Adequate coagulation was obtained along the way  The scissors were then attached to cautery and used to open the cuff  The LigaSure was also used for coagulation around the cuff  The uterus was then removed  The cuff was closed using the Endo Stitch device  Adequate hemostasis was obtained along the way  A cystoscopy was performed and both ureters were seen to have adequate flow  There are no lesions in the bladder and no evidence of leak  The bubble was visualized  Patient was removed from Trendelenburg position and the incisions were closed  The 10  Trocar was 1st closing the fascia with 0 Vicryl in a figure-of-eight manner  The skin was closed with 4-0 Monocryl  0 25% Marcaine without epinephrine was then used for analgesia the port sites  Patient was returned to the supine position where she revived easily from an  She was escorted to the recovery room in good condition     I was present for the entire procedure    Patient Disposition:  PACU       SIGNATURE: Charmayne Poli, MD  DATE: July 11, 2022  TIME: 12:32 PM

## 2022-07-11 NOTE — INTERVAL H&P NOTE
H&P reviewed  After examining the patient I find no changes in the patients condition since the H&P had been written      Vitals:    07/11/22 0921   BP: 125/56   Pulse: 89   Resp: 15   Temp: 99 1 °F (37 3 °C)   SpO2: 99%

## 2022-07-14 DIAGNOSIS — Z90.710 S/P LAPAROSCOPIC ASSISTED VAGINAL HYSTERECTOMY (LAVH): ICD-10-CM

## 2022-07-18 LAB
GLUCOSE SERPL-MCNC: 203 MG/DL (ref 65–140)
GLUCOSE SERPL-MCNC: 69 MG/DL (ref 65–140)

## 2022-07-18 RX ORDER — IBUPROFEN 600 MG/1
600 TABLET ORAL EVERY 6 HOURS PRN
Qty: 30 TABLET | Refills: 0 | Status: SHIPPED | OUTPATIENT
Start: 2022-07-18 | End: 2022-07-23

## 2022-07-22 DIAGNOSIS — Z90.710 S/P LAPAROSCOPIC ASSISTED VAGINAL HYSTERECTOMY (LAVH): ICD-10-CM

## 2022-07-23 RX ORDER — IBUPROFEN 600 MG/1
600 TABLET ORAL EVERY 6 HOURS PRN
Qty: 30 TABLET | Refills: 0 | Status: SHIPPED | OUTPATIENT
Start: 2022-07-23 | End: 2022-08-11

## 2022-07-25 ENCOUNTER — OFFICE VISIT (OUTPATIENT)
Dept: OBGYN CLINIC | Facility: CLINIC | Age: 42
End: 2022-07-25

## 2022-07-25 VITALS
DIASTOLIC BLOOD PRESSURE: 80 MMHG | WEIGHT: 137 LBS | HEIGHT: 63 IN | BODY MASS INDEX: 24.27 KG/M2 | SYSTOLIC BLOOD PRESSURE: 128 MMHG

## 2022-07-25 DIAGNOSIS — Z90.710 S/P LAPAROSCOPIC ASSISTED VAGINAL HYSTERECTOMY (LAVH): Primary | ICD-10-CM

## 2022-07-25 PROCEDURE — 99024 POSTOP FOLLOW-UP VISIT: CPT | Performed by: OBSTETRICS & GYNECOLOGY

## 2022-07-25 NOTE — PROGRESS NOTES
Assessment      Doing well postoperatively  Operative findings again reviewed  Pathology report discussed  Plan     1  Continue any current medications  2  Wound care discussed  3  Activity restrictions: no lifting more than 25 pounds  4  Anticipated return to work: now  5  Follow up: 4 weeks for follow up  Subjective     Henry Clark is a 37 y o  female who presents to the clinic 2 weeks status post laparoscopic assisted vaginal hysterectomy for abnormal uterine bleeding  Eating a regular diet without difficulty  Bowel movements are normal  Pain is controlled without any medications  The following portions of the patient's history were reviewed and updated as appropriate: allergies, current medications, past family history, past medical history, past social history, past surgical history and problem list     Review of Systems  Pertinent items are noted in HPI        Objective     /80 (BP Location: Left arm)   Ht 5' 3" (1 6 m)   Wt 62 1 kg (137 lb)   LMP 07/10/2022   BMI 24 27 kg/m²   General:  alert and oriented, in no acute distress   Abdomen: soft, bowel sounds active, non-tender   Incision:   healing well, no drainage, no erythema, no hernia, no seroma, no swelling, no dehiscence, incision well approximated

## 2022-08-11 DIAGNOSIS — Z90.710 S/P LAPAROSCOPIC ASSISTED VAGINAL HYSTERECTOMY (LAVH): ICD-10-CM

## 2022-08-11 RX ORDER — IBUPROFEN 600 MG/1
600 TABLET ORAL EVERY 6 HOURS PRN
Qty: 30 TABLET | Refills: 0 | Status: SHIPPED | OUTPATIENT
Start: 2022-08-11 | End: 2022-08-30

## 2022-08-18 NOTE — OP NOTE
OPERATIVE REPORT  PATIENT NAME: Jose Mueller    :  1980  MRN: [id number]  Pt Location: [facility] OR ROOM 02    SURGERY DATE: 2022    Surgeon(s) and Role:     * Molly Meléndez MD - Primary     * Honey Mederos MD - Assisting (there was no resident available to assist on this case)    Preop Diagnosis:  Excessive or frequent menstruation [N92 0]    Post-Op Diagnosis Codes:     * Excessive or frequent menstruation [N92 0]    Procedure(s) (LRB):  HYSTERECTOMY LAPAROSCOPIC ASSISTED VAGINAL (LAVH) (N/A)    Specimen(s):  ID Type Source Tests Collected by Time Destination   1 : uterus and cervix & BL tubes Tissue Uterus TISSUE EXAM Molly Meléndez MD 2022 12:05 PM    2 : bilateral tubes Tissue Fallopian Tubes, Bilateral TISSUE EXAM Molly Meléndez MD 2022 12:09 PM        Estimated Blood Loss:   100 mL    Drains:  NG/OG/Enteral Tube Orogastric 18 Fr Center mouth (Active)   Number of days: 38       [REMOVED] Urethral Catheter Double-lumen 16 Fr   (Removed)   Number of days: 0       Anesthesia Type:   General    Operative Indications:  Excessive or frequent menstruation [N92 0]      Operative Findings:  See op note    Complications:   None    Procedure and Technique:  See op note   I was present for the entire procedure    Patient Disposition:  PACU       SIGNATURE: Molly Meléndez MD  DATE: 2022  TIME: 4:18 PM

## 2022-08-22 ENCOUNTER — OFFICE VISIT (OUTPATIENT)
Dept: OBGYN CLINIC | Facility: CLINIC | Age: 42
End: 2022-08-22

## 2022-08-22 VITALS
DIASTOLIC BLOOD PRESSURE: 76 MMHG | BODY MASS INDEX: 24.63 KG/M2 | HEIGHT: 63 IN | SYSTOLIC BLOOD PRESSURE: 130 MMHG | WEIGHT: 139 LBS

## 2022-08-22 DIAGNOSIS — Z90.710 S/P LAPAROSCOPIC ASSISTED VAGINAL HYSTERECTOMY (LAVH): Primary | ICD-10-CM

## 2022-08-22 PROCEDURE — 99024 POSTOP FOLLOW-UP VISIT: CPT | Performed by: OBSTETRICS & GYNECOLOGY

## 2022-08-22 NOTE — PROGRESS NOTES
Assessment      Doing well postoperatively  Operative findings again reviewed  Pathology report discussed  Plan     1  Continue any current medications  2  Wound care discussed  3  Activity restrictions: none  4  Anticipated return to work: now  5  Follow up: 1 year for annual          Zuri Ma is a 40 y o  female who presents to the clinic 6 weeks status post laparoscopic assisted vaginal hysterectomy for abnormal uterine bleeding  Eating a regular diet without difficulty  Bowel movements are normal  The patient is not having any pain  The following portions of the patient's history were reviewed and updated as appropriate: allergies, current medications, past family history, past medical history, past social history, past surgical history and problem list     Review of Systems  Pertinent items are noted in HPI        Objective     /76 (BP Location: Left arm, Patient Position: Sitting, Cuff Size: Standard)   Ht 5' 3" (1 6 m)   Wt 63 kg (139 lb)   LMP 07/10/2022   BMI 24 62 kg/m²   General:  alert and oriented, in no acute distress   Abdomen: soft, bowel sounds active, non-tender   Incision:   healing well, no drainage, no erythema, no hernia, no seroma, no swelling, no dehiscence, incision well approximated

## 2022-08-27 DIAGNOSIS — Z90.710 S/P LAPAROSCOPIC ASSISTED VAGINAL HYSTERECTOMY (LAVH): ICD-10-CM

## 2022-08-30 RX ORDER — IBUPROFEN 600 MG/1
600 TABLET ORAL EVERY 6 HOURS PRN
Qty: 30 TABLET | Refills: 0 | Status: SHIPPED | OUTPATIENT
Start: 2022-08-30 | End: 2022-09-14

## 2022-08-31 ENCOUNTER — CONSULT (OUTPATIENT)
Dept: ENDOCRINOLOGY | Facility: CLINIC | Age: 42
End: 2022-08-31
Payer: COMMERCIAL

## 2022-08-31 VITALS
HEART RATE: 94 BPM | SYSTOLIC BLOOD PRESSURE: 130 MMHG | DIASTOLIC BLOOD PRESSURE: 82 MMHG | BODY MASS INDEX: 24.63 KG/M2 | HEIGHT: 63 IN | WEIGHT: 139 LBS

## 2022-08-31 DIAGNOSIS — E11.65 UNCONTROLLED TYPE 2 DIABETES MELLITUS WITH HYPERGLYCEMIA (HCC): Primary | ICD-10-CM

## 2022-08-31 DIAGNOSIS — E78.00 PURE HYPERCHOLESTEROLEMIA: ICD-10-CM

## 2022-08-31 DIAGNOSIS — I10 PRIMARY HYPERTENSION: ICD-10-CM

## 2022-08-31 PROCEDURE — 99205 OFFICE O/P NEW HI 60 MIN: CPT | Performed by: INTERNAL MEDICINE

## 2022-08-31 RX ORDER — METFORMIN HYDROCHLORIDE 500 MG/1
1000 TABLET, EXTENDED RELEASE ORAL
Qty: 120 TABLET | Refills: 1 | Status: SHIPPED | OUTPATIENT
Start: 2022-08-31

## 2022-08-31 RX ORDER — PEN NEEDLE, DIABETIC 32GX 5/32"
NEEDLE, DISPOSABLE MISCELLANEOUS DAILY
COMMUNITY
Start: 2022-07-26

## 2022-08-31 RX ORDER — LANCETS 33 GAUGE
EACH MISCELLANEOUS
Qty: 200 EACH | Refills: 3 | Status: SHIPPED | OUTPATIENT
Start: 2022-08-31

## 2022-08-31 RX ORDER — BLOOD SUGAR DIAGNOSTIC
STRIP MISCELLANEOUS
Qty: 100 STRIP | Refills: 3 | Status: SHIPPED | OUTPATIENT
Start: 2022-08-31

## 2022-08-31 NOTE — ASSESSMENT & PLAN NOTE
Lab Results   Component Value Date    HGBA1C 7 5 (H) 08/04/2022     - Currently on Basaglar 25 units qAM, 15 units qhs   - with episodes of hypoglycemia 2-3x a week    Plan:   · Will obtain labs as outlined below  · Decrease insulin regimen to 30 units daily  · Start metformin XR 1000mg bid (with taper up to full dose)  · Consider starting a statin at next visit given her risk factors and elevated LDL  · Diabetic education referral

## 2022-08-31 NOTE — PATIENT INSTRUCTIONS
Start metformin  mg orally twice a day, if you have not gastric side effects increase to 3 tablets after 3-4 days and then to 4 tablets taken as a 1000 mg orally twice a day   Change Basaglar to 30 units subcutaneously once a day either in the morning or at bedtime   If you notice any low/ tight blood sugars please decrease Basaglar further to 25 units once a day and let me know   Please check blood sugars more often at least twice a day before meals and at bedtime, send log for review in 2-3 weeks   Please have labs done as ordered

## 2022-08-31 NOTE — PROGRESS NOTES
Sasha Allison 40 y o  female MRN: [id number]    Encounter: [id number]      Assessment/Plan     Assessment: This is a 40y o -year-old female with diabetes with hyperglycemia, hypertension and hyperlipidemia who is seen in consult regarding her diabetes  Plan:  1  Uncontrolled type 2 diabetes mellitus with hyperglycemia (HCC)  Assessment & Plan:    Lab Results   Component Value Date    HGBA1C 7 5 (H) 08/04/2022     - Currently on Basaglar 25 units qAM, 15 units qhs   - with episodes of hypoglycemia 2-3x a week    Plan: Will obtain labs as outlined below  Decrease insulin regimen to 30 units daily  Start metformin XR 1000mg bid (with taper up to full dose)  Consider starting a statin at next visit given her risk factors and elevated LDL  Diabetic education referral    Orders:  -     Ambulatory Referral to Endocrinology  -     metFORMIN (GLUCOPHAGE-XR) 500 mg 24 hr tablet; Take 2 tablets (1,000 mg total) by mouth daily with dinner  -     Ambulatory referral to Diabetic Education; Future  -     Glucometer  -     Glucometer test strips  -     Lancets  -     Lipid panel; Future  -     Microalbumin / creatinine urine ratio  -     Comprehensive metabolic panel; Future  -     Glutamic acid decarboxylase Lab Collect; Future  -     Anti-islet cell antibody; Future  -     C-peptide Lab Collect; Future  -     Ambulatory referral to Diabetic Education    2  Primary hypertension  -     metFORMIN (GLUCOPHAGE-XR) 500 mg 24 hr tablet; Take 2 tablets (1,000 mg total) by mouth daily with dinner  -     Ambulatory referral to Diabetic Education; Future  -     Glucometer  -     Glucometer test strips  -     Lancets  -     Lipid panel; Future  -     Microalbumin / creatinine urine ratio  -     Comprehensive metabolic panel; Future  -     Glutamic acid decarboxylase Lab Collect; Future  -     Anti-islet cell antibody; Future  -     C-peptide Lab Collect;  Future  -     Ambulatory referral to Diabetic Education        CC: Diabetes    History of Present Illness     Type: 2    Onset: Formal diagnosis in 2010, but had had gestational diabetes in all three pregnancies  No antibodies found in the chart, and patient does not recall them ever being tested  Symptoms: patient only has symptoms of hypoglycemia that happen 2-3x a week at night, with associated diaphoresis and dizziness  No symptoms with hyperglycemia    - does have carpal tunnel with numbness in her fingers    Medications: metformin initially (remembers tolerating well), then Windell Emma, and is unsure, but may have been on Victoza-- had GI side effects, prompting discontinuation    - was also on short acting insulin during her pregnancies  - Currently: Basaglar 25 units qAM, 15 units qhs    Blood Sugars: does not take them frequently, but only when she wakes up in the middle of the night with dizziness and diaphoresis and has had BG in 50s  -- no day time sugars    Meter: using her father's old meter, will need new one    Diet: pastas, breads, not a lot of vegetables  3 meals a day  Exercise:enjoys [employer], has been working her way back up, after her hysterectomy in July    Influenza vaccine: Immunization status: up to date and documented, received in 2021  Pneumovax: Immunization status: unknown status, patient to bring shot records  Ophthalmology: follows with optometry, last visit 4/2022, no retinopathy  Podiatry/Foot care: never has had a diabetic foot exam  Dentist: every 6 months  Family history of diabetes: father  Diabetes education/nutrition: during pregnancy, but not since her formal diagnosis in 2010  Review of Systems   Constitutional: Positive for diaphoresis  Negative for chills, fever and unexpected weight change  HENT: Negative for ear pain, hearing loss and sore throat  Eyes: Negative for pain and visual disturbance  Respiratory: Positive for cough (since she had covid in may 2022)  Negative for shortness of breath  Cardiovascular: Negative for chest pain, palpitations and leg swelling  Gastrointestinal: Negative for abdominal pain, diarrhea, nausea and vomiting  Endocrine: Negative for polydipsia, polyphagia and polyuria  Genitourinary: Negative for difficulty urinating, dysuria and hematuria  Skin: Negative for color change and rash  Neurological: Positive for dizziness and numbness  Negative for seizures, syncope, weakness, light-headedness and headaches  All other systems reviewed and are negative  Historical Information   Past Medical History:   Diagnosis Date    COVID 01/2021    Covid + 5-16-22 runny nose,cough THEN 5-19-22 Covid Negative    Diabetes mellitus ([facility] [address] )     History of transfusion     as an infant    Hypertension      Past Surgical History:   Procedure Laterality Date    CHOLECYSTECTOMY      MN LAP,VAG HYST,UTERUS 250GMS/< N/A 7/11/2022    Procedure: HYSTERECTOMY LAPAROSCOPIC ASSISTED VAGINAL (LAVH);   Surgeon: Dora Dozier MD;  Location:  MAIN OR;  Service: Gynecology    TONSILLECTOMY AND ADENOIDECTOMY      US GUIDED BREAST BIOPSY LEFT COMPLETE Left 5/23/2022     Social History   Social History     Substance and Sexual Activity   Alcohol Use Yes    Comment: daily 3-4 beers per day     Social History     Substance and Sexual Activity   Drug Use Never     Social History     Tobacco Use   Smoking Status Current Every Day Smoker    Packs/day: 0 50    Years: 20 00    Pack years: 10 00    Types: Cigarettes   Smokeless Tobacco Never Used     Family History:   Family History   Problem Relation Age of Onset    Breast cancer Mother 54    Prostate cancer Father     No Known Problems Daughter     No Known Problems Maternal Grandmother     No Known Problems Maternal Grandfather     No Known Problems Paternal Grandmother     No Known Problems Paternal Grandfather     Melanoma Maternal Aunt     No Known Problems Paternal Aunt        Meds/Allergies   Current Outpatient Medications Medication Sig Dispense Refill    BD Pen Needle Rhea 2nd Gen 32G X 4 MM MISC daily      cetirizine (ZyrTEC) 10 mg tablet Take 10 mg by mouth every morning      fluticasone (FLONASE) 50 mcg/act nasal spray 1 spray into each nostril if needed for rhinitis      gabapentin (NEURONTIN) 300 mg capsule Take 300 mg by mouth 2 (two) times a day      ibuprofen (MOTRIN) 600 mg tablet TAKE 1 TABLET (600 MG TOTAL) BY MOUTH EVERY 6 (SIX) HOURS AS NEEDED FOR MILD PAIN OR MODERATE PAIN 30 tablet 0    Insulin Glargine (BASAGLAR KWIKPEN SC) Inject 30 Units under the skin daily with breakfast       metFORMIN (GLUCOPHAGE-XR) 500 mg 24 hr tablet Take 2 tablets (1,000 mg total) by mouth daily with dinner 120 tablet 1    ramipril (ALTACE) 2 5 mg capsule Take 2 5 mg by mouth every morning      sertraline (ZOLOFT) 50 mg tablet Take 50 mg by mouth every morning       No current facility-administered medications for this visit  Allergies   Allergen Reactions    Erythromycin Rash       Objective   Vitals: Blood pressure 130/82, pulse 94, height 5' 3" (1 6 m), weight 63 kg (139 lb), last menstrual period 07/10/2022  Physical Exam  Vitals reviewed  Constitutional:       General: She is not in acute distress  Appearance: Normal appearance  HENT:      Head: Normocephalic and atraumatic  Eyes:      Extraocular Movements: Extraocular movements intact  Conjunctiva/sclera: Conjunctivae normal    Neck:      Thyroid: No thyroid mass, thyromegaly or thyroid tenderness  Trachea: Trachea and phonation normal    Cardiovascular:      Rate and Rhythm: Normal rate and regular rhythm  Pulses: Normal pulses  no weak pulses          Dorsalis pedis pulses are 2+ on the right side and 2+ on the left side  Posterior tibial pulses are 2+ on the right side and 2+ on the left side  Heart sounds: Normal heart sounds  Pulmonary:      Effort: Pulmonary effort is normal  No respiratory distress        Breath sounds: Normal breath sounds  No wheezing  Abdominal:      General: Abdomen is flat  Bowel sounds are normal  There is no distension  Palpations: Abdomen is soft  Tenderness: There is no abdominal tenderness  Musculoskeletal:         General: No swelling or tenderness  Cervical back: Neck supple  No tenderness  Feet:      Right foot:      Skin integrity: No ulcer, skin breakdown, erythema, warmth, callus or dry skin  Left foot:      Skin integrity: No ulcer, skin breakdown, erythema, warmth, callus or dry skin  Lymphadenopathy:      Cervical: No cervical adenopathy  Skin:     General: Skin is warm and dry  Coloration: Skin is not pale  Findings: No rash  Neurological:      Mental Status: She is alert and oriented to person, place, and time  Motor: No weakness  Psychiatric:         Mood and Affect: Mood normal      Diabetic Foot Exam    Patient's shoes and socks removed  Right Foot/Ankle   Right Foot Inspection  Skin Exam: skin normal and skin intact  No dry skin, no warmth, no callus, no erythema, no maceration, no abnormal color, no pre-ulcer, no ulcer and no callus  Toe Exam: ROM and strength within normal limits  No swelling    Sensory   Proprioception: intact  Monofilament testing: intact    Vascular  The right DP pulse is 2+  The right PT pulse is 2+  Left Foot/Ankle  Left Foot Inspection  Skin Exam: skin normal and skin intact  No dry skin, no warmth, no erythema, no maceration, normal color, no pre-ulcer, no ulcer and no callus  Toe Exam: ROM and strength within normal limits  No swelling  Sensory   Proprioception: intact  Monofilament testing: intact    Vascular  The left DP pulse is 2+  The left PT pulse is 2+  Assign Risk Category  No deformity present  No loss of protective sensation  No weak pulses  Risk: 0      The history was obtained from the review of the chart, patient      Lab Results:   Lab Results   Component Value Date/Time Hemoglobin A1C 7 5 (H) 08/04/2022 11:23 AM    Hemoglobin A1C 8 1 (H) 05/09/2022 10:12 AM    Hemoglobin A1C 8 1 05/09/2022 12:00 AM    Hemoglobin A1C 9 0 (H) 03/25/2022 07:43 AM     Imaging Studies: No pertinent films to be reviewed    Portions of the record may have been created with voice recognition software  Occasional wrong word or "sound a like" substitutions may have occurred due to the inherent limitations of voice recognition software  Read the chart carefully and recognize, using context, where substitutions have occurred

## 2022-09-14 DIAGNOSIS — Z90.710 S/P LAPAROSCOPIC ASSISTED VAGINAL HYSTERECTOMY (LAVH): ICD-10-CM

## 2022-09-14 RX ORDER — IBUPROFEN 600 MG/1
600 TABLET ORAL EVERY 6 HOURS PRN
Qty: 30 TABLET | Refills: 0 | Status: SHIPPED | OUTPATIENT
Start: 2022-09-14 | End: 2022-09-29

## 2022-09-20 ENCOUNTER — TELEPHONE (OUTPATIENT)
Dept: ENDOCRINOLOGY | Facility: CLINIC | Age: 42
End: 2022-09-20

## 2022-09-20 NOTE — TELEPHONE ENCOUNTER
Reviewed    Multiple low/tight blood sugars  Decrease Basaglar to 25 units subcutaneously daily  If patient continues to have low blood sugars, should decrease dose further and let me know   Send log for review in 2-3 weeks

## 2022-09-21 ENCOUNTER — TELEPHONE (OUTPATIENT)
Dept: DIABETES SERVICES | Facility: CLINIC | Age: 42
End: 2022-09-21

## 2022-09-22 NOTE — TELEPHONE ENCOUNTER
Spoke to patient, she advised she is having diarrhea, nausea and upset stomach from Metformin, if the metformin is going to be stopped then she will not be comfortable decreasing the Basaglar

## 2022-09-22 NOTE — TELEPHONE ENCOUNTER
Please ask the patient if  she was able to tolerate a lower dose of metformin XR, if not, she should completely discontinue at   Would like to review blood sugars on the current dose of brain supple are only

## 2022-09-29 DIAGNOSIS — Z90.710 S/P LAPAROSCOPIC ASSISTED VAGINAL HYSTERECTOMY (LAVH): ICD-10-CM

## 2022-09-29 RX ORDER — IBUPROFEN 600 MG/1
600 TABLET ORAL EVERY 6 HOURS PRN
Qty: 30 TABLET | Refills: 0 | Status: SHIPPED | OUTPATIENT
Start: 2022-09-29

## 2022-11-14 ENCOUNTER — TELEPHONE (OUTPATIENT)
Dept: ENDOCRINOLOGY | Facility: CLINIC | Age: 42
End: 2022-11-14

## 2022-11-14 NOTE — TELEPHONE ENCOUNTER
Called patient to schedule a follow up appointment at our [facility] clinic or our [facility] office

## 2022-11-21 ENCOUNTER — TELEPHONE (OUTPATIENT)
Dept: ENDOCRINOLOGY | Facility: CLINIC | Age: 42
End: 2022-11-21

## 2022-11-21 NOTE — TELEPHONE ENCOUNTER
Left message to schedule an appointment at our [facility] or our [facility]

## 2023-11-01 ENCOUNTER — HOSPITAL ENCOUNTER (EMERGENCY)
Facility: HOSPITAL | Age: 43
Discharge: HOME | End: 2023-11-01
Attending: EMERGENCY MEDICINE
Payer: COMMERCIAL

## 2023-11-01 ENCOUNTER — APPOINTMENT (EMERGENCY)
Dept: RADIOLOGY | Facility: HOSPITAL | Age: 43
End: 2023-11-01
Payer: COMMERCIAL

## 2023-11-01 ENCOUNTER — APPOINTMENT (EMERGENCY)
Dept: CT IMAGING | Facility: HOSPITAL | Age: 43
End: 2023-11-01
Payer: COMMERCIAL

## 2023-11-01 VITALS
WEIGHT: 145 LBS | SYSTOLIC BLOOD PRESSURE: 154 MMHG | TEMPERATURE: 98.1 F | HEART RATE: 79 BPM | DIASTOLIC BLOOD PRESSURE: 71 MMHG | RESPIRATION RATE: 18 BRPM | OXYGEN SATURATION: 99 % | BODY MASS INDEX: 25.69 KG/M2

## 2023-11-01 DIAGNOSIS — R07.89 ATYPICAL CHEST PAIN: ICD-10-CM

## 2023-11-01 DIAGNOSIS — N30.00 ACUTE CYSTITIS WITHOUT HEMATURIA: Primary | ICD-10-CM

## 2023-11-01 LAB
ALBUMIN SERPL BCP-MCNC: 4.6 G/DL (ref 3.5–5)
ALP SERPL-CCNC: 90 U/L (ref 34–104)
ALT SERPL W P-5'-P-CCNC: 27 U/L (ref 7–52)
ANION GAP SERPL CALCULATED.3IONS-SCNC: 8 MMOL/L
AST SERPL W P-5'-P-CCNC: 23 U/L (ref 13–39)
ATRIAL RATE: 117 BPM
BACTERIA UR QL AUTO: ABNORMAL /HPF
BASOPHILS # BLD AUTO: 0.03 THOUSANDS/ÂΜL (ref 0–0.1)
BASOPHILS NFR BLD AUTO: 0 % (ref 0–1)
BILIRUB SERPL-MCNC: 0.58 MG/DL (ref 0.2–1)
BILIRUB UR QL STRIP: NEGATIVE
BNP SERPL-MCNC: 12 PG/ML (ref 0–100)
BUN SERPL-MCNC: 10 MG/DL (ref 5–25)
CALCIUM SERPL-MCNC: 9.6 MG/DL (ref 8.4–10.2)
CARDIAC TROPONIN I PNL SERPL HS: 3 NG/L
CARDIAC TROPONIN I PNL SERPL HS: 4 NG/L (ref 8–18)
CHLORIDE SERPL-SCNC: 98 MMOL/L (ref 96–108)
CLARITY UR: ABNORMAL
CO2 SERPL-SCNC: 26 MMOL/L (ref 21–32)
COLOR UR: ABNORMAL
CREAT SERPL-MCNC: 0.71 MG/DL (ref 0.6–1.3)
D DIMER PPP FEU-MCNC: 0.27 UG/ML FEU
EOSINOPHIL # BLD AUTO: 0.02 THOUSAND/ÂΜL (ref 0–0.61)
EOSINOPHIL NFR BLD AUTO: 0 % (ref 0–6)
ERYTHROCYTE [DISTWIDTH] IN BLOOD BY AUTOMATED COUNT: 12.6 % (ref 11.6–15.1)
EXT PREGNANCY TEST URINE: NEGATIVE
EXT. CONTROL: NORMAL
GFR SERPL CREATININE-BSD FRML MDRD: 105 ML/MIN/1.73SQ M
GLUCOSE SERPL-MCNC: 249 MG/DL (ref 65–140)
GLUCOSE SERPL-MCNC: 321 MG/DL (ref 65–140)
GLUCOSE UR STRIP-MCNC: ABNORMAL MG/DL
HCT VFR BLD AUTO: 46.9 % (ref 34.8–46.1)
HGB BLD-MCNC: 16.2 G/DL (ref 11.5–15.4)
HGB UR QL STRIP.AUTO: NEGATIVE
IMM GRANULOCYTES # BLD AUTO: 0.04 THOUSAND/UL (ref 0–0.2)
IMM GRANULOCYTES NFR BLD AUTO: 0 % (ref 0–2)
KETONES UR STRIP-MCNC: ABNORMAL MG/DL
LACTATE SERPL-SCNC: 0.4 MMOL/L (ref 0.5–2)
LEUKOCYTE ESTERASE UR QL STRIP: ABNORMAL
LIPASE SERPL-CCNC: 29 U/L (ref 11–82)
LYMPHOCYTES # BLD AUTO: 2.36 THOUSANDS/ÂΜL (ref 0.6–4.47)
LYMPHOCYTES NFR BLD AUTO: 19 % (ref 14–44)
MAGNESIUM SERPL-MCNC: 2.1 MG/DL (ref 1.9–2.7)
MCH RBC QN AUTO: 33.9 PG (ref 26.8–34.3)
MCHC RBC AUTO-ENTMCNC: 34.5 G/DL (ref 31.4–37.4)
MCV RBC AUTO: 98 FL (ref 82–98)
MONOCYTES # BLD AUTO: 0.9 THOUSAND/ÂΜL (ref 0.17–1.22)
MONOCYTES NFR BLD AUTO: 7 % (ref 4–12)
MUCOUS THREADS UR QL AUTO: ABNORMAL
NEUTROPHILS # BLD AUTO: 8.81 THOUSANDS/ÂΜL (ref 1.85–7.62)
NEUTS SEG NFR BLD AUTO: 74 % (ref 43–75)
NITRITE UR QL STRIP: NEGATIVE
NON-SQ EPI CELLS URNS QL MICRO: ABNORMAL /HPF
NRBC BLD AUTO-RTO: 0 /100 WBCS
P AXIS: 81 DEGREES
PH UR STRIP.AUTO: 6.5 [PH]
PLATELET # BLD AUTO: 348 THOUSANDS/UL (ref 149–390)
PMV BLD AUTO: 9.2 FL (ref 8.9–12.7)
POTASSIUM SERPL-SCNC: 4.2 MMOL/L (ref 3.5–5.3)
PR INTERVAL: 112 MS
PROT SERPL-MCNC: 7.4 G/DL (ref 6.4–8.4)
PROT UR STRIP-MCNC: ABNORMAL MG/DL
QRS AXIS: 73 DEGREES
QRSD INTERVAL: 70 MS
QT INTERVAL: 320 MS
QTC INTERVAL: 446 MS
RBC # BLD AUTO: 4.78 MILLION/UL (ref 3.81–5.12)
RBC #/AREA URNS AUTO: ABNORMAL /HPF
SODIUM SERPL-SCNC: 132 MMOL/L (ref 135–147)
SP GR UR STRIP.AUTO: 1.03 (ref 1–1.03)
T WAVE AXIS: 57 DEGREES
UROBILINOGEN UR STRIP-ACNC: <2 MG/DL
VENTRICULAR RATE: 117 BPM
WBC # BLD AUTO: 12.16 THOUSAND/UL (ref 4.31–10.16)
WBC #/AREA URNS AUTO: ABNORMAL /HPF

## 2023-11-01 PROCEDURE — 83880 ASSAY OF NATRIURETIC PEPTIDE: CPT

## 2023-11-01 PROCEDURE — 36415 COLL VENOUS BLD VENIPUNCTURE: CPT

## 2023-11-01 PROCEDURE — 83735 ASSAY OF MAGNESIUM: CPT

## 2023-11-01 PROCEDURE — 83605 ASSAY OF LACTIC ACID: CPT

## 2023-11-01 PROCEDURE — 93010 ELECTROCARDIOGRAM REPORT: CPT | Performed by: INTERNAL MEDICINE

## 2023-11-01 PROCEDURE — 81025 URINE PREGNANCY TEST: CPT

## 2023-11-01 PROCEDURE — 87086 URINE CULTURE/COLONY COUNT: CPT

## 2023-11-01 PROCEDURE — 84484 ASSAY OF TROPONIN QUANT: CPT | Performed by: EMERGENCY MEDICINE

## 2023-11-01 PROCEDURE — 99285 EMERGENCY DEPT VISIT HI MDM: CPT | Performed by: EMERGENCY MEDICINE

## 2023-11-01 PROCEDURE — 85379 FIBRIN DEGRADATION QUANT: CPT

## 2023-11-01 PROCEDURE — 74177 CT ABD & PELVIS W/CONTRAST: CPT

## 2023-11-01 PROCEDURE — 85025 COMPLETE CBC W/AUTO DIFF WBC: CPT | Performed by: EMERGENCY MEDICINE

## 2023-11-01 PROCEDURE — 83690 ASSAY OF LIPASE: CPT

## 2023-11-01 PROCEDURE — 80053 COMPREHEN METABOLIC PANEL: CPT | Performed by: EMERGENCY MEDICINE

## 2023-11-01 PROCEDURE — 71045 X-RAY EXAM CHEST 1 VIEW: CPT

## 2023-11-01 PROCEDURE — 87040 BLOOD CULTURE FOR BACTERIA: CPT

## 2023-11-01 PROCEDURE — 99285 EMERGENCY DEPT VISIT HI MDM: CPT

## 2023-11-01 PROCEDURE — 82948 REAGENT STRIP/BLOOD GLUCOSE: CPT

## 2023-11-01 PROCEDURE — 96360 HYDRATION IV INFUSION INIT: CPT

## 2023-11-01 PROCEDURE — 93005 ELECTROCARDIOGRAM TRACING: CPT

## 2023-11-01 PROCEDURE — G1004 CDSM NDSC: HCPCS

## 2023-11-01 PROCEDURE — 81001 URINALYSIS AUTO W/SCOPE: CPT

## 2023-11-01 RX ORDER — CEFUROXIME AXETIL 500 MG/1
500 TABLET ORAL EVERY 12 HOURS SCHEDULED
Qty: 14 TABLET | Refills: 0 | Status: SHIPPED | OUTPATIENT
Start: 2023-11-01 | End: 2023-11-08

## 2023-11-01 RX ORDER — CEFUROXIME AXETIL 250 MG/1
500 TABLET ORAL ONCE
Status: COMPLETED | OUTPATIENT
Start: 2023-11-01 | End: 2023-11-01

## 2023-11-01 RX ADMIN — CEFUROXIME AXETIL 500 MG: 250 TABLET, FILM COATED ORAL at 14:15

## 2023-11-01 RX ADMIN — IOHEXOL 85 ML: 350 INJECTION, SOLUTION INTRAVENOUS at 13:18

## 2023-11-01 RX ADMIN — SODIUM CHLORIDE 1000 ML: 0.9 INJECTION, SOLUTION INTRAVENOUS at 11:42

## 2023-11-01 NOTE — DISCHARGE INSTRUCTIONS
Make sure you complete the entire prescription of Antibiotics and take every day's dosage as prescribed. Do not suddenly stop taking, even if you feel better. If you are having a reaction to the medication, contact your PCP or return to the ED.

## 2023-11-01 NOTE — ED PROVIDER NOTES
History  Chief Complaint   Patient presents with    Chest Pain     Pt reports chest and back pain/pressure, onset Monday, today worsening, reports feels like a gallbladder attack but has gallbladder removed, no relief with tums or advil     72-year-old female history of remote cholecystectomy, hysterectomy last year presenting with substernal chest pain. Patient states that 2 days ago she woke up with substernal chest pain, stabbing in nature, also feeling in the back. Feels similar to when she was having gallbladder problems but gallbladder was removed many years ago. No fevers, chills, nausea, vomiting, shortness of breath. Able to go about activities of daily life without limits by shortness of breath. Tolerating normal diet. Normal bowel movement earlier today. No history of blood clots, recent travel, recent surgery, estrogen use, or leg swelling. No personal history of cardiac disease however both parents had early cardiac disease in their 46s. Prior to Admission Medications   Prescriptions Last Dose Informant Patient Reported? Taking?    BD Pen Needle Rhea 2nd Gen 32G X 4 MM MISC   Yes No   Sig: daily   Insulin Glargine (BASAGLAR KWIKPEN SC)   Yes No   Sig: Inject 30 Units under the skin daily with breakfast    OneTouch Delica Lancets 23O MISC   No No   Sig: Use to test blood sugar 4 times a day   cetirizine (ZyrTEC) 10 mg tablet   Yes No   Sig: Take 10 mg by mouth every morning   fluticasone (FLONASE) 50 mcg/act nasal spray   Yes No   Si spray into each nostril if needed for rhinitis   gabapentin (NEURONTIN) 300 mg capsule   Yes No   Sig: Take 300 mg by mouth 2 (two) times a day   glucose blood (OneTouch Verio) test strip   No No   Sig: Use to test blood sugar 4 times a day   ibuprofen (MOTRIN) 600 mg tablet   No No   Sig: TAKE 1 TABLET (600 MG TOTAL) BY MOUTH EVERY 6 (SIX) HOURS AS NEEDED FOR MILD PAIN OR MODERATE PAIN   metFORMIN (GLUCOPHAGE-XR) 500 mg 24 hr tablet   No No   Sig: TAKE 2 TABLETS (1,000 MG TOTAL) BY MOUTH DAILY WITH DINNER   ramipril (ALTACE) 2.5 mg capsule   Yes No   Sig: Take 2.5 mg by mouth every morning   sertraline (ZOLOFT) 50 mg tablet   Yes No   Sig: Take 50 mg by mouth every morning      Facility-Administered Medications: None       Past Medical History:   Diagnosis Date    COVID 01/2021    Covid + 5-16-22 runny nose,cough THEN 5-19-22 Covid Negative    Diabetes mellitus ([address])     History of transfusion     as an infant    Hypertension        Past Surgical History:   Procedure Laterality Date    CHOLECYSTECTOMY      DC LAPS VAGINAL HYSTERECTOMY UTERUS 250 GM/< N/A 7/11/2022    Procedure: HYSTERECTOMY LAPAROSCOPIC ASSISTED VAGINAL (LAVH); Surgeon: Jessica Staley MD;  Location:  MAIN OR;  Service: Gynecology    TONSILLECTOMY AND ADENOIDECTOMY      US GUIDED BREAST BIOPSY LEFT COMPLETE Left 5/23/2022       Family History   Problem Relation Age of Onset    Breast cancer Mother 54    Prostate cancer Father     No Known Problems Daughter     No Known Problems Maternal Grandmother     No Known Problems Maternal Grandfather     No Known Problems Paternal Grandmother     No Known Problems Paternal Grandfather     Melanoma Maternal Aunt     No Known Problems Paternal Aunt      I have reviewed and agree with the history as documented. E-Cigarette/Vaping    E-Cigarette Use Never User      E-Cigarette/Vaping Substances    Nicotine Yes     THC No     CBD No     Flavoring No     Other No     Unknown No      Social History     Tobacco Use    Smoking status: Every Day     Packs/day: 0.50     Years: 20.00     Total pack years: 10.00     Types: Cigarettes    Smokeless tobacco: Never   Vaping Use    Vaping Use: Never used   Substance Use Topics    Alcohol use: Yes     Comment: daily 3-4 beers per day    Drug use: Never        Review of Systems   Constitutional:  Negative for activity change, fever and unexpected weight change.    Respiratory:  Negative for cough, chest tightness and shortness of breath. Cardiovascular:  Positive for chest pain. Negative for palpitations and leg swelling. Gastrointestinal:  Positive for abdominal pain. Negative for diarrhea, nausea and vomiting. Genitourinary:  Negative for dysuria and hematuria. Skin:  Negative for wound. Allergic/Immunologic: Negative for immunocompromised state. Neurological:  Negative for syncope. All other systems reviewed and are negative. Physical Exam  ED Triage Vitals [11/01/23 1024]   Temperature Pulse Respirations Blood Pressure SpO2   98.1 °F (36.7 °C) (!) 140 18 (!) 190/127 98 %      Temp Source Heart Rate Source Patient Position - Orthostatic VS BP Location FiO2 (%)   Oral Monitor -- Right arm --      Pain Score       --             Orthostatic Vital Signs  Vitals:    11/01/23 1145 11/01/23 1330 11/01/23 1400 11/01/23 1500   BP: 152/70 157/73 148/68 154/71   Pulse: (!) 108 91 79 79       Physical Exam  Vitals and nursing note reviewed. Constitutional:       General: She is in acute distress. Appearance: She is not toxic-appearing or diaphoretic. HENT:      Head: Normocephalic and atraumatic. Right Ear: External ear normal.      Left Ear: External ear normal.      Nose: Nose normal.      Mouth/Throat:      Mouth: Mucous membranes are moist.   Eyes:      General: No scleral icterus. Extraocular Movements: Extraocular movements intact. Conjunctiva/sclera: Conjunctivae normal.   Cardiovascular:      Rate and Rhythm: Regular rhythm. Tachycardia present. Pulses: Normal pulses. Radial pulses are 2+ on the right side. Dorsalis pedis pulses are 2+ on the right side and 2+ on the left side. Heart sounds: Normal heart sounds, S1 normal and S2 normal. No murmur heard. Pulmonary:      Effort: Pulmonary effort is normal. No tachypnea, accessory muscle usage or respiratory distress. Breath sounds: Normal breath sounds. No stridor.  No decreased breath sounds, wheezing, rhonchi or rales. Abdominal:      General: A surgical scar is present. Bowel sounds are normal.      Palpations: Abdomen is soft. Tenderness: There is abdominal tenderness in the right lower quadrant. There is no right CVA tenderness, left CVA tenderness, guarding or rebound. Negative signs include Ramesh's sign, Rovsing's sign and McBurney's sign. Musculoskeletal:         General: Normal range of motion. Cervical back: Normal range of motion. Skin:     General: Skin is warm and dry. Neurological:      General: No focal deficit present. Mental Status: She is alert and oriented to person, place, and time. Psychiatric:         Mood and Affect: Mood normal.         ED Medications  Medications   sodium chloride 0.9 % bolus 1,000 mL (0 mL Intravenous Stopped 11/1/23 1253)   iohexol (OMNIPAQUE) 350 MG/ML injection (SINGLE-DOSE) 85 mL (85 mL Intravenous Given 11/1/23 1318)   cefuroxime (CEFTIN) tablet 500 mg (500 mg Oral Given 11/1/23 1415)       Diagnostic Studies  Results Reviewed       Procedure Component Value Units Date/Time    High Sensitivity Troponin I Random [[id number]]  (Abnormal) Collected: 11/01/23 1412    Lab Status: Final result Specimen: Blood from Arm, Left Updated: 11/01/23 1443     HS TnI random 4 ng/L     Lactic acid, plasma (w/reflex if result > 2.0) [[id number]]  (Abnormal) Collected: 11/01/23 1136    Lab Status: Final result Specimen: Blood from Arm, Left Updated: 11/01/23 1211     LACTIC ACID 0.4 mmol/L     Narrative:      Result may be elevated if tourniquet was used during collection. Urine Microscopic [[id number]]  (Abnormal) Collected: 11/01/23 1140    Lab Status: Final result Specimen: Urine, Clean Catch Updated: 11/01/23 1206     RBC, UA 4-10 /hpf      WBC, UA Innumerable /hpf      Epithelial Cells Moderate /hpf      Bacteria, UA Innumerable /hpf      MUCUS THREADS Occasional    Urine culture [[id number]] Collected: 11/01/23 1140    Lab Status:  In process Specimen: Urine, Clean Catch Updated: 11/01/23 1206    UA w Reflex to Microscopic w Reflex to Culture [[id number]]  (Abnormal) Collected: 11/01/23 1140    Lab Status: Final result Specimen: Urine, Clean Catch Updated: 11/01/23 1203     Color, UA Light Yellow     Clarity, UA Turbid     Specific Gravity, UA 1.030     pH, UA 6.5     Leukocytes, UA Large     Nitrite, UA Negative     Protein, UA Trace mg/dl      Glucose, UA >=1000 (1%) mg/dl      Ketones, UA 20 (1+) mg/dl      Urobilinogen, UA <2.0 mg/dl      Bilirubin, UA Negative     Occult Blood, UA Negative    Lipase [[id number]]  (Normal) Collected: 11/01/23 1029    Lab Status: Final result Specimen: Blood from Arm, Left Updated: 11/01/23 1158     Lipase 29 u/L     Magnesium [[id number]]  (Normal) Collected: 11/01/23 1029    Lab Status: Final result Specimen: Blood from Arm, Left Updated: 11/01/23 1158     Magnesium 2.1 mg/dL     POCT pregnancy, urine [[id number]]  (Normal) Resulted: 11/01/23 1152    Lab Status: Final result Updated: 11/01/23 1152     EXT Preg Test, Ur Negative     Control Valid    Fingerstick Glucose (POCT) [[id number]]  (Abnormal) Collected: 11/01/23 1140    Lab Status: Final result Updated: 11/01/23 1143     POC Glucose 249 mg/dl     Blood culture #2 [[id number]] Collected: 11/01/23 1136    Lab Status: In process Specimen: Blood from Arm, Left Updated: 11/01/23 1141    B-Type Natriuretic Peptide(BNP) [[id number]]  (Normal) Collected: 11/01/23 1029    Lab Status: Final result Specimen: Blood from Arm, Left Updated: 11/01/23 1130     BNP 12 pg/mL     Blood culture #1 [[id number]] Collected: 11/01/23 1118    Lab Status:  In process Specimen: Blood from Arm, Left Updated: 11/01/23 1124    D-Dimer [[id number]]  (Normal) Collected: 11/01/23 1029    Lab Status: Final result Specimen: Blood from Arm, Left Updated: 11/01/23 1114     D-Dimer, Quant 0.27 ug/ml FEU     HS Troponin 0hr (reflex protocol) [[id number]]  (Normal) Collected: 11/01/23 1029    Lab Status: Final result Specimen: Blood from Arm, Left Updated: 11/01/23 1105     hs TnI 0hr 3 ng/L     Comprehensive metabolic panel [[id number]]  (Abnormal) Collected: 11/01/23 1029    Lab Status: Final result Specimen: Blood from Arm, Left Updated: 11/01/23 1104     Sodium 132 mmol/L      Potassium 4.2 mmol/L      Chloride 98 mmol/L      CO2 26 mmol/L      ANION GAP 8 mmol/L      BUN 10 mg/dL      Creatinine 0.71 mg/dL      Glucose 321 mg/dL      Calcium 9.6 mg/dL      AST 23 U/L      ALT 27 U/L      Alkaline Phosphatase 90 U/L      Total Protein 7.4 g/dL      Albumin 4.6 g/dL      Total Bilirubin 0.58 mg/dL      eGFR 105 ml/min/1.73sq m     Narrative:      National Kidney Disease Foundation guidelines for Chronic Kidney Disease (CKD):     Stage 1 with normal or high GFR (GFR > 90 mL/min/1.73 square meters)    Stage 2 Mild CKD (GFR = 60-89 mL/min/1.73 square meters)    Stage 3A Moderate CKD (GFR = 45-59 mL/min/1.73 square meters)    Stage 3B Moderate CKD (GFR = 30-44 mL/min/1.73 square meters)    Stage 4 Severe CKD (GFR = 15-29 mL/min/1.73 square meters)    Stage 5 End Stage CKD (GFR <15 mL/min/1.73 square meters)  Note: GFR calculation is accurate only with a steady state creatinine    CBC and differential [[id number]]  (Abnormal) Collected: 11/01/23 1029    Lab Status: Final result Specimen: Blood from Arm, Left Updated: 11/01/23 1042     WBC 12.16 Thousand/uL      RBC 4.78 Million/uL      Hemoglobin 16.2 g/dL      Hematocrit 46.9 %      MCV 98 fL      MCH 33.9 pg      MCHC 34.5 g/dL      RDW 12.6 %      MPV 9.2 fL      Platelets 288 Thousands/uL      nRBC 0 /100 WBCs      Neutrophils Relative 74 %      Immat GRANS % 0 %      Lymphocytes Relative 19 %      Monocytes Relative 7 %      Eosinophils Relative 0 %      Basophils Relative 0 %      Neutrophils Absolute 8.81 Thousands/µL      Immature Grans Absolute 0.04 Thousand/uL      Lymphocytes Absolute 2.36 Thousands/µL      Monocytes Absolute 0.90 Thousand/µL      Eosinophils Absolute 0.02 Thousand/µL      Basophils Absolute 0.03 Thousands/µL                    CT abdomen pelvis with contrast   Final Result by Whitney Whalen MD (11/01 4968)      No acute findings in the abdomen or pelvis. Resident: Tawanda Arora, the attending radiologist, have reviewed the images and agree with the final report above. Workstation performed: [id number]         XR chest 1 view portable   ED Interpretation by Elyssa Torres MD (11/01 1137)   Chest xray independently interpreted by me. No acute cardiopulmonary disease. No subdiaphragmatic free air. Final Result by Zo Lara MD (11/01 1342)      No acute cardiopulmonary disease. Workstation performed: [id number]               Procedures  ECG 12 Lead Documentation Only    Date/Time: 11/1/2023 10:41 AM    Performed by: Elyssa Torres MD  Authorized by: Elyssa Torres MD    Indications / Diagnosis:  Cp  ECG reviewed by me, the ED Provider: yes    Patient location:  ED  Previous ECG:     Previous ECG:  Compared to current    Comparison ECG info:  5/23/22    Similarity:  Changes noted (now in sinus tach)    Comparison to cardiac monitor: Yes    Interpretation:     Interpretation: non-specific    Quality:     Tracing quality:  Limited by artifact  Rate:     ECG rate:  117    ECG rate assessment: tachycardic    Rhythm:     Rhythm: sinus tachycardia    Ectopy:     Ectopy: none    QRS:     QRS axis:  Normal    QRS intervals:  Normal  Conduction:     Conduction: normal    ST segments:     ST segments:  Normal  T waves:     T waves: normal    Other findings:     Other findings: ARUN          ED Course  ED Course as of 11/01/23 1539   Wed Nov 01, 2023   1043 WBC(!): 12.16   1104 Glucose, Random(!): 321   1104 Sodium(!): 132  Normal when corrected for hyperglycemia   1106 hs TnI 0hr: 3   1120 D-Dimer, Quant: 0.27   1137 XR chest 1 view portable  Chest xray independently interpreted by me.   No acute cardiopulmonary disease. No subdiaphragmatic free air. 1152 PREGNANCY TEST URINE: Negative   1233 Bacteria, UA(!): Innumerable   1444 HS TnI random(!): 4   1454 CT abdomen pelvis with contrast  No acute findings in the abdomen or pelvis. HEART Risk Score      Flowsheet Row Most Recent Value   Heart Score Risk Calculator    History 0 Filed at: 11/01/2023 1538   ECG 0 Filed at: 11/01/2023 1538   Age 0 Filed at: 11/01/2023 1538   Risk Factors 2 Filed at: 11/01/2023 1538   Troponin 0 Filed at: 11/01/2023 1538   HEART Score 2 Filed at: 11/01/2023 1538                PERC Rule for PE      Flowsheet Row Most Recent Value   PERC Rule for PE    Age >=50 0 Filed at: 11/01/2023 1538   HR >=100 1 Filed at: 11/01/2023 1538   O2 Sat on room air < 95% 0 Filed at: 11/01/2023 1538   History of PE or DVT 0 Filed at: 11/01/2023 1538   Recent trauma or surgery 0 Filed at: 11/01/2023 1538   Hemoptysis 0 Filed at: 11/01/2023 1538   Exogenous estrogen 0 Filed at: 11/01/2023 1538   Unilateral leg swelling 0 Filed at: 11/01/2023 1538   PERC Rule for PE Results 1 Filed at: 11/01/2023 1538                    Wells' Criteria for PE      Flowsheet Row Most Recent Value   Wells' Criteria for PE    Clinical signs and symptoms of DVT 0 Filed at: 11/01/2023 1538   PE is primary diagnosis or equally likely 3 Filed at: 11/01/2023 1538   HR >100 1.5 Filed at: 11/01/2023 1538   Immobilization at least 3 days or Surgery in the previous 4 weeks 0 Filed at: 11/01/2023 1538   Previous, objectively diagnosed PE or DVT 0 Filed at: 11/01/2023 1538   Hemoptysis 0 Filed at: 11/01/2023 1538   Malignancy with treatment within 6 months or palliative 0 Filed at: 11/01/2023 1538   Wells' Criteria Total 4.5 Filed at: 11/01/2023 1538              Medical Decision Making  Initial Impression: Acute abdominal pain differential includes ACS, pancreatitis, bowel infection, appendicitis, obstruction, UTI/pyelonephritis, kidney stone.   ACS lower likely given lack of shortness of breath, nausea, vomiting, diaphoresis though does have significant family history. Some concern for PE especially given how tachycardic she is though the tachycardia could also be from infection. Although has had cholecystectomy, possible that she could have choledocholithiasis +/- ascending cholangitis    Initial Work Up: ACS evaluation including ecg/troponin/chest xray, infectious evaluation including blood cultures/ua/respiratory swab/vbg/lactate, CT abdomen/pelvis versus CT PE study depending on D-dimer, and CBC, CMP, lipase    Final Impression: Evaluation reveals UTI without any other acute pathology. No ACS. D-dimer of 0.27 ruling out PE. No acute pathology identified with CT scan. Patient's vitals much improved after 1 L of saline. Patient started on Ceftin and discharged on the same. With the atypical chest pain that is not fully explained by UTI, will have her follow-up with cardiology for reevaluation though she had negative troponin and ECG so no need for emergent testing. Patient discharged in good condition. Problems Addressed:  Acute cystitis without hematuria: complicated acute illness or injury  Atypical chest pain: undiagnosed new problem with uncertain prognosis    Amount and/or Complexity of Data Reviewed  Labs: ordered. Decision-making details documented in ED Course. Radiology: ordered and independent interpretation performed. Decision-making details documented in ED Course. ECG/medicine tests: ordered and independent interpretation performed. Risk  Prescription drug management.           Disposition  Final diagnoses:   Acute cystitis without hematuria   Atypical chest pain     Time reflects when diagnosis was documented in both MDM as applicable and the Disposition within this note       Time User Action Codes Description Comment    11/1/2023  2:55 PM Aman Wong Add [N30.00] Acute cystitis without hematuria     11/1/2023  2:58 PM Bakari [address] [facility] [R07.89] Atypical chest pain           ED Disposition       ED Disposition   Discharge    Condition   Stable    Date/Time   Wed Nov 1, 2023 [id number] discharge to home/self care. Follow-up Information       Follow up With Specialties Details Why Contact Info Additional [address] [facility]  A referral has been placed for you. They should call you to schedule appointment. If you have not heard from them in a week, give this number a call [address] [facility] [id number]  [address] [facility], [address] [facility], [address] [place], [place], [address] [facility]    Dora Mcguire MD Internal Medicine Schedule an appointment as soon as possible for a visit  For reevaluation [address] [place] [address]. [address]  [address] [place].  [address] [facility]  [phone]               Discharge Medication List as of 11/1/2023  2:59 PM        START taking these medications    Details   cefuroxime (CEFTIN) 500 mg tablet Take 1 tablet (500 mg total) by mouth every 12 (twelve) hours for 7 days, Starting Wed 11/1/2023, Until Wed 11/8/2023, Normal           CONTINUE these medications which have NOT CHANGED    Details   BD Pen Needle Rhea 2nd Gen 32G X 4 MM MISC daily, Starting Tue 7/26/2022, Historical Med      cetirizine (ZyrTEC) 10 mg tablet Take 10 mg by mouth every morning, Historical Med      fluticasone (FLONASE) 50 mcg/act nasal spray 1 spray into each nostril if needed for rhinitis, Historical Med      gabapentin (NEURONTIN) 300 mg capsule Take 300 mg by mouth 2 (two) times a day, Historical Med      glucose blood (OneTouch Verio) test strip Use to test blood sugar 4 times a day, Normal      ibuprofen (MOTRIN) 600 mg tablet TAKE 1 TABLET (600 MG TOTAL) BY MOUTH EVERY 6 (SIX) HOURS AS NEEDED FOR MILD PAIN OR MODERATE PAIN, Starting Thu 9/29/2022, Normal      Insulin Glargine (BASAGLAR KWIKPEN SC) Inject 30 Units under the skin daily with breakfast , Historical Med      metFORMIN (GLUCOPHAGE-XR) 500 mg 24 hr tablet TAKE 2 TABLETS (1,000 MG TOTAL) BY MOUTH DAILY WITH DINNER, Starting Fri 11/11/2022, Normal      OneTouch Delica Lancets 83K MISC Use to test blood sugar 4 times a day, Normal      ramipril (ALTACE) 2.5 mg capsule Take 2.5 mg by mouth every morning, Starting Tue 3/15/2022, Historical Med      sertraline (ZOLOFT) 50 mg tablet Take 50 mg by mouth every morning, Historical Med               PDMP Review       None             ED Provider  Attending physically available and evaluated Mary Anne Grant. I managed the patient along with the ED Attending.     Electronically Signed by           Elyssa Torres MD  11/01/23 26 [id number]

## 2023-11-01 NOTE — Clinical Note
Ayush Garcias was seen and treated in our emergency department on 11/1/2023. Diagnosis:     [place]  . She may return on this date: 11/03/2023    Leyla Deshpande was seen in the ED on 11/01/23. Please excuse her from work for today and tomorrow as she gets better       If you have any questions or concerns, please don't hesitate to call.       Tomi Herrera MD    ______________________________           _______________          _______________  Hospital Representative                              Date                                Time

## 2023-11-03 LAB — BACTERIA UR CULT: ABNORMAL

## 2023-11-05 LAB
BACTERIA BLD CULT: NORMAL
BACTERIA BLD CULT: NORMAL

## 2023-11-06 LAB
BACTERIA BLD CULT: NORMAL
BACTERIA BLD CULT: NORMAL

## 2023-11-06 NOTE — ED ATTENDING ATTESTATION
11/1/2023  Nuvia Rosenberg DO, saw and evaluated the patient. I have discussed the patient with the resident/non-physician practitioner and agree with the resident's/non-physician practitioner's findings, Plan of Care, and MDM as documented in the resident's/non-physician practitioner's note, except where noted. All available labs and Radiology studies were reviewed. I was present for key portions of any procedure(s) performed by the resident/non-physician practitioner and I was immediately available to provide assistance. At this point I agree with the current assessment done in the Emergency Department.   I have conducted an independent evaluation of this patient a history and physical is as follows:    ED Course         Critical Care Time  Procedures

## 2024-09-23 NOTE — PROGRESS NOTES
"Speech-Language Pathology Initial Evaluation    Today's date: 2024   Patient’s name: Priya Le  : 1980  MRN: [id number]  Safety measures: GERD  Referring provider: Nazario Mazariegos MD    Encounter Diagnosis     ICD-10-CM    1. Unspecified voice and resonance disorder  R49.9       2. Dysphonia  R49.0 Ambulatory referral to Speech Therapy      3. 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 Patient has decreased phonational frequency range demonstrated during counting, reading passage, and gliding tasks. Trials of resonant voice therapy completed, patient was stimulable for RVT.  Patient has c/o \"raspiness\" with voicing that has remained stable. She also reports seasonal allergies and reflux which likely contribute to her dysphonia. Patient was educated on various vocal abuse behaviors and vocal hygiene throughout assessment. Vocal abuses included increased mentholated lozenges, caffeine/alcohol intake, coughing and throat-clearing. Vocal hygiene strategies included maintaining increased water intake, decreased caffeine/alcohol intake, throat-clearing awareness, increased breath support/diaphragmatic breathing. Patient was agreeable. At this time, it is recommended that Patient begin skilled outpatient Speech Therapy 1X a week to target voice therapy goals to improve vocal quality.  Prognosis is good based on strong patient motivation.     Short Term Goals:      Patient will use diaphragmatic breathing in conjunction with voicing at the sound prolongation, word, and phrase level with 90% accuracy in 8 weeks.   Patient will demonstrate the use of relaxation exercises and " diaphragmatic breathing to facilitate reduced laryngeal tension and improved vocal quality, to be achieved in 8 weeks.  Patient will learn and apply resonance techniques at the sound, word, sentence, and paragraph levels with min verbal cues to facilitate improved vocal quality, to be achieved in 8 weeks.  Patient will be educated on vocal hygiene and demonstrate understanding of recommendations to facilitate increased quality of voice, to be achieved in 8 weeks.  Patient will increase MPT to >15 seconds, to be achieved by discharge.     Long-term goals:  Patient will improve vocal quality for increased functional communication by discharge.     Plan:  Patient would benefit from outpatient skilled Speech Therapy services: Voice therapy    Frequency: 1x weekly  Duration: 2 months    Intervention certification from: 9/25/2024  Intervention certification to: 11/25/2024      Subjective:  History of present illness: Patient is a 44 y.o. female who was referred to outpatient skilled Speech Therapy services for a voice evaluation. ENT visit with Dr. 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    Patient's PCP noticed raspy voice and referred patient to ENT. Patient stated during f/u ENT appt, there was improvement from previous exam but her voice remained the same with overall quality.    Patient's goal(s): Improve vocal quality     Hearing: WFL  Vision: WFL with glasses    Home environment/lifestyle: Live at home with   and children   Vocational status: not working      Objective (testing):  VOICE EVALUATION:    Interview:   Chief complaint: raspy voice within the last year  -Onset: gradual  -Symptom progression since onset has been: stable  -Associated symptoms: none  -Voice is better: no known pattern  -Voice is worse: no known pattern    -Occupation: n/a  -Vocal demand: low (voice use includes: When family gets home from work/school)  -Amplification: Never  -Past training or therapy: none  -Past problems: none    -Singing: no    -Voice surgery: no    Vocal hygiene:  -Smoking: stopped at end of June; began when she was 18 y/o  -Water intake daily in oz: 6-8, 16 oz cups   -Alcohol intake servings weekly: 3 glasses daily   -Caffeine intake daily in oz: 3 cups   -Caffeine-free beverages daily in oz: n/a  -Throat clearing: occasional  -Lozenges: mentholated  -Exposure to chemicals, dry, or julianne environments: none    -Cough: no  -Dyspnea: no  -Dysphagia: no    -Allergy testing: yes  -Allergies: environmental  -Nasal symptoms: post-nasal drip and sneezing  -GERD/LPR symptoms: heartburn  -Recurrent URI: none  -Other major medical conditions: DM      Subjective Observations:    Patient Self-Ratings:  -The Voice Handicap Index (VHI) is a self-administered, 30-item outcomes instrument to quantify patients' perception of their voice handicap. It is a list of statements that many people have used to describe their voices and the effects of their voices on their lives. Patient indicated how frequently she has the same experience using a rating point scale (“never” = 0, “almost never” = 1, “sometimes” = 2, “almost always” = 3, and “always” = 4). Patient obtained a score of 2/120, indicating a self-perceived impairment level of mild. (see scanned document)    Subscale: Score: Self-Perceived Impairment Level:   Physical 2/40 Mild   Functional 2/40 Mild   Emotional 0/40 N/a        TOTAL 4/120 Mild     Objective Measurements/Voice  "Parameters:  RESPIRATORY EFFICIENCY:   -S:Z Ratio Task: Patient was instructed to sustain the sounds /s/ and /z/ to examine the coordination and efficiency of respiration and voice production. Normative data suggests that adults can prolong these sounds for 20-25 seconds. Ratios of 1.4 and above are consistent with laryngeal inefficiency, and ratios of 2.0 and above are suggestive of vocal fold pathology. Patient's S:Z ratio was 1.08 (10.89 sec : 10.07 sec).     -Maximum Phonation Time: Patient was instructed to sustain /a/ to measure respiratory and laryngeal coordination and efficiency. Adults are typically able to prolong vowels sound for 15-20 seconds. Reduced MPT may suggest poor respiratory support, or poor medial glottal closure. Patient's MPT was 11.01 seconds.      MEASURES OF PITCH:  -Habitual Pitch: Patient was instructed to engage in two different speaking tasks (counting and reading) to calculate the pitch she uses most frequently.     Task: Min-Max Range (Hz) Normative Data:   Speaking (counting 1-10) 126-140 Hz  (180 Hz -250 Hz)   Reading (\"[place]”) 105-149 Hz  (180 Hz -250 Hz)       -Maximal Phonational Frequency Range: Patient was instructed to voice from lowest to highest notes.     Task Min-Max Range (Hz) Normative Data:   Gliding 111 Hz-238 Hz 134 Hz-895 Hz       Visit Tracking:  POC   Expires Auth Expiration Date ST Visit Limit   11/25/2024 N/a 60 PCY (ST, OT, PT)          Visit/Unit Tracking:  Auth Status Date 9/25   Not required Used 1    Remaining 59       Intervention Comments:  Discussed POC and goals with patient; patient in agreement.  "

## 2024-09-25 ENCOUNTER — EVALUATION (OUTPATIENT)
Dept: SPEECH THERAPY | Facility: CLINIC | Age: 44
End: 2024-09-25
Payer: COMMERCIAL

## 2024-09-25 DIAGNOSIS — R49.9 UNSPECIFIED VOICE AND RESONANCE DISORDER: Primary | ICD-10-CM

## 2024-09-25 DIAGNOSIS — R49.0 DYSPHONIA: ICD-10-CM

## 2024-09-25 DIAGNOSIS — J38.3 POLYPOID DEGENERATION OF VOCAL CORDS: ICD-10-CM

## 2024-09-25 PROCEDURE — 92524 BEHAVRAL QUALIT ANALYS VOICE: CPT

## 2024-09-30 ENCOUNTER — OFFICE VISIT (OUTPATIENT)
Dept: SPEECH THERAPY | Facility: CLINIC | Age: 44
End: 2024-09-30
Payer: COMMERCIAL

## 2024-09-30 DIAGNOSIS — J38.3 POLYPOID DEGENERATION OF VOCAL CORDS: ICD-10-CM

## 2024-09-30 DIAGNOSIS — R49.9 UNSPECIFIED VOICE AND RESONANCE DISORDER: ICD-10-CM

## 2024-09-30 DIAGNOSIS — R49.0 DYSPHONIA: Primary | ICD-10-CM

## 2024-09-30 PROCEDURE — 92507 TX SP LANG VOICE COMM INDIV: CPT

## 2024-09-30 NOTE — PROGRESS NOTES
"Speech Treatment Note    Today's date: 2024  Patient name: Priya Le  : 1980  MRN: [id number]  Referring provider: Nazario Mazariegos MD  Dx:   Encounter Diagnosis     ICD-10-CM    1. Dysphonia  R49.0       2. 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 Unspecified voice and resonance disorder  R49.9                  Visit Tracking:  POC   Expires Auth Expiration Date ST Visit Limit   2024 N/a 60 PCY (ST, OT, PT)              Visit/Unit Tracking:  Auth Status Date            Not required Used 1 2             Remaining 59 58             Subjective/Behavioral:Patient arrived on-time and attended well to session.    Provided diaphragmatic breathing and SOVT packets as part of HEP.    Short-Term Goals:  Patient will use diaphragmatic breathing in conjunction with voicing at the sound prolongation, word, and phrase level with 90% accuracy in 8 weeks.   To target diaphragmatic breathing: Patient was seated chair and was instructed to place on hand on their stomach and one hand on their chest while breathing in through their nose and out through pursed lip.  Patient was noted to have clavicular/abdominal movement.  Patient completed this type of breathing over 5 reps.     To target diaphragmatic breathing: Patient was still seated chair and was instructed to try \"ratio breathing\" where they inhaled through their nose for 3/4 seconds and exhaled through pursed lip for 6/8 seconds respectively.  Patient was noted to use clavicular breathing.  Patient completed this type of breathing over 6 reps.  Patient was not able to sustain breath for all 8 seconds.      Patient will demonstrate the use of relaxation exercises and diaphragmatic breathing to facilitate reduced laryngeal tension and improved vocal quality, to be achieved in 8 weeks.  Semi-Occluded Vocal Tract Exercises  To target relaxed laryngeal posture and coordination between phonation and respiration, the following " "activities were completed using principles of SOVTE.     Cup Bubbling:   Steady blowing - no voice. Completed 2 reps, avg 10 secs.  Patient was able to demonstrate steady bubbling/airflow.   Steady blowing - voice ON. Completed 3 reps, avg 12 secs. Patient was able to demonstrate steady bubbling/airflow. Patient cued to use front focus, feeling buzzing on lips.   Steady blowing - voice ON and OFF. Completed 5 reps. Patient was able to keep continuous airflow/patient demonstrated difficulty with continuous airflow.   Steady blowing - voice ON, short/small pitch glides, approx. 3-5 notes. Completed over 3 reps.     Patient will learn and apply resonance techniques at the sound, word, sentence, and paragraph levels with min verbal cues to facilitate improved vocal quality, to be achieved in 8 weeks.  To target forward focus: Patient practiced humming /m/ sound and then combining it with a vowel sound (e.g. ah, ay, ee, oo) to feel vibration in nose and hard palate.  Patient practiced this over 10 trials with approx. 50% accuracy.     Patient demonstrated some difficulty to feel forward focus \"buzz\". Educated on strategies such as forward vs. Negative practice, hands on nose, tongue behind bottom teeth, and tongue out position.    Patient will be educated on vocal hygiene and demonstrate understanding of recommendations to facilitate increased quality of voice, to be achieved in 8 weeks.   Continued education on vocal hygiene techniques. Patient verbalizes agreement/understanding.    Patient will increase MPT to >15 seconds, to be achieved by discharge.  Not addressed.    Other:Reviewed testing and plan of care with patient.Patient is in agreement with POC at this time.  Recommendations:Continue with Plan of Care  "

## 2024-10-10 ENCOUNTER — OFFICE VISIT (OUTPATIENT)
Dept: SPEECH THERAPY | Facility: CLINIC | Age: 44
End: 2024-10-10
Payer: COMMERCIAL

## 2024-10-10 DIAGNOSIS — R49.9 UNSPECIFIED VOICE AND RESONANCE DISORDER: ICD-10-CM

## 2024-10-10 DIAGNOSIS — J38.3 POLYPOID DEGENERATION OF VOCAL CORDS: ICD-10-CM

## 2024-10-10 DIAGNOSIS — R49.0 DYSPHONIA: Primary | ICD-10-CM

## 2024-10-10 PROCEDURE — 92507 TX SP LANG VOICE COMM INDIV: CPT

## 2024-10-10 NOTE — PROGRESS NOTES
Speech Treatment Note    Today's date: 10/10/2024  Patient name: Priya Le  : 1980  MRN: [id number]  Referring provider: Nazario Mazariegos MD  Dx:   Encounter Diagnosis     ICD-10-CM    1. Dysphonia  R49.0       2. 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 Unspecified voice and resonance disorder  R49.9                  Visit Tracking:  POC   Expires Auth Expiration Date ST Visit Limit   2024 N/a 60 PCY (ST, OT, PT)              Visit/Unit Tracking:  Auth Status Date 9/25 9/30 10/10          Not required Used 1 2 3            Remaining 59 58 57            Subjective/Behavioral:Patient arrived on-time and attended well to session.    Short-Term Goals:  Patient will use diaphragmatic breathing in conjunction with voicing at the sound prolongation, word, and phrase level with 90% accuracy in 8 weeks.   Patient used clavicular breathing occasionally during session; redirected patient to diaphragmatic breathing for exercises with clinician demonstration.    Patient will demonstrate the use of relaxation exercises and diaphragmatic breathing to facilitate reduced laryngeal tension and improved vocal quality, to be achieved in 8 weeks.  Semi-Occluded Vocal Tract Exercises  To target relaxed laryngeal posture and coordination between phonation and respiration, the following activities were completed using principles of SOVTE.     Cup Bubbling:   Steady blowing - no voice. Completed 2 reps, avg 12 secs.  Patient was able to demonstrate steady bubbling/airflow.   Steady blowing - voice ON. Completed 3 reps, avg 12 secs. Patient was able to demonstrate steady bubbling/airflow. Patient cued to use front focus, feeling buzzing on lips.   Steady blowing - voice ON and OFF. Completed 5 reps. Patient was able to keep continuous airflow/patient demonstrated difficulty with continuous airflow.   Steady blowing - voice ON, short/small pitch glides, approx. 3-4 notes. Completed over 2 reps.  "    Patient will learn and apply resonance techniques at the sound, word, sentence, and paragraph levels with min verbal cues to facilitate improved vocal quality, to be achieved in 8 weeks.  To target forward focus: Patient practiced humming /m/ sound and then combining it with a vowel sound (e.g. ah, ay, ee, oo) to feel vibration in nose and hard palate.  Patient practiced this over 10 trials with approx. 60% accuracy.     Patient demonstrated some difficulty to feel forward focus \"buzz\". Educated on strategies such as forward vs. Negative practice, hands on nose, tongue behind bottom teeth, and tongue out position.    To target forward focus: Patient practiced reading single syllable /m/ words by humming first to achieve nasal resonance.  Patient read words with a \"sing-song,\" gentle quality with approx. 80% accuracy.   Patient read words 3X, fading the \"sing-song\" quality from their voice each repetition.  Patient completed this task with approx. 70% accuracy.     To target forward focus: Patient stated sentences with single syllable /m/ sentence \"Ann made me mad\" targeting nasal resonance.  Patient stated sentences with approx. 50% accuracy.     Patient will be educated on vocal hygiene and demonstrate understanding of recommendations to facilitate increased quality of voice, to be achieved in 8 weeks.   Continued education on vocal hygiene techniques. Patient verbalizes agreement/understanding.    Patient will increase MPT to >15 seconds, to be achieved by discharge.  Not addressed.    Other:Reviewed testing and plan of care with patient.Patient is in agreement with POC at this time.  Recommendations:Continue with Plan of Care  "

## 2024-10-17 ENCOUNTER — OFFICE VISIT (OUTPATIENT)
Dept: SPEECH THERAPY | Facility: CLINIC | Age: 44
End: 2024-10-17
Payer: COMMERCIAL

## 2024-10-17 DIAGNOSIS — J38.3 POLYPOID DEGENERATION OF VOCAL CORDS: ICD-10-CM

## 2024-10-17 DIAGNOSIS — R49.9 UNSPECIFIED VOICE AND RESONANCE DISORDER: ICD-10-CM

## 2024-10-17 DIAGNOSIS — R49.0 DYSPHONIA: Primary | ICD-10-CM

## 2024-10-17 PROCEDURE — 92507 TX SP LANG VOICE COMM INDIV: CPT

## 2024-10-17 NOTE — PROGRESS NOTES
Speech Treatment Note    Today's date: 10/17/2024  Patient name: Priya Le  : 1980  MRN: [id number]  Referring provider: Nazario Mazariegos MD  Dx:   Encounter Diagnosis     ICD-10-CM    1. Dysphonia  R49.0       2. 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 Unspecified voice and resonance disorder  R49.9                  Visit Tracking:  POC   Expires Auth Expiration Date ST Visit Limit   2024 N/a 60 PCY (ST, OT, PT)              Visit/Unit Tracking:  Auth Status Date 9/25 9/30 10/10 10/17         Not required Used 1 2 3 4           Remaining 59 58 57 56           Subjective/Behavioral:Patient arrived on-time and attended well to session.    Short-Term Goals:  Patient will use diaphragmatic breathing in conjunction with voicing at the sound prolongation, word, and phrase level with 90% accuracy in 8 weeks.   Patient used clavicular breathing occasionally during session; redirected patient to diaphragmatic breathing for exercises with clinician demonstration.    Patient will demonstrate the use of relaxation exercises and diaphragmatic breathing to facilitate reduced laryngeal tension and improved vocal quality, to be achieved in 8 weeks.    Semi-Occluded Vocal Tract Exercises  To target relaxed laryngeal posture and coordination between phonation and respiration, the following activities were completed using principles of SOVTE.     Straw only vibration/singing  Sustained voicing through straw. Completed over 2 reps 10-11s.   Short/small pitch glides, approx. 4 notes. Completed over 2 reps.   Long/full pitch glides, lowest to highest pitches. Completed over 1 reps, 3 notes.   Humming/Voicing familiar song through straw- attempted happy birthday with increased difficulty to complete this task.    Patient will learn and apply resonance techniques at the sound, word, sentence, and paragraph levels with min verbal cues to facilitate improved vocal quality, to be achieved in 8  "weeks.  To target forward focus: Patient practiced humming /m/ sound and then combining it with a vowel sound (e.g. ah, ay, ee, oo) to feel vibration in nose and hard palate.  Patient practiced this over 10 trials with approx. 70% accuracy.     Patient demonstrated some difficulty to feel forward focus \"buzz\". Educated on strategies such as forward vs. Negative practice, hands on nose, tongue behind bottom teeth, and tongue out position.    To target forward focus: Patient practiced reading single syllable /m/ words by humming first to achieve nasal resonance.  Patient read words with a \"sing-song,\" gentle quality with approx. 60% accuracy.   Patient read words 3X, fading the \"sing-song\" quality from their voice each repetition.  Patient completed this task with approx. 90% accuracy.     To target forward focus: Patient stated sentences with single syllable /m/ sentences targeting nasal resonance.  Patient stated sentences with approx. 25% accuracy independently, increasing to 75% with repetition.     Patient will be educated on vocal hygiene and demonstrate understanding of recommendations to facilitate increased quality of voice, to be achieved in 8 weeks.   Continued education on vocal hygiene techniques. Patient verbalizes agreement/understanding.    Patient will increase MPT to >15 seconds, to be achieved by discharge.  Not addressed.    Other:Reviewed testing and plan of care with patient.Patient is in agreement with POC at this time.  Recommendations:Continue with Plan of Care  "

## 2024-10-23 NOTE — PROGRESS NOTES
Speech Treatment Note    Today's date: 10/24/2024  Patient name: Priya Le  : 1980  MRN: [id number]  Referring provider: Nazario Mazariegos MD  Dx:   Encounter Diagnosis     ICD-10-CM    1. Dysphonia  R49.0       2. 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 Unspecified voice and resonance disorder  R49.9                    Visit Tracking:  POC   Expires Auth Expiration Date ST Visit Limit   2024 N/a 60 PCY (ST, OT, PT)              Visit/Unit Tracking:  Auth Status Date 9/25 9/30 10/10 10/17 10/24        Not required Used 1 2 3 4 5          Remaining 59 58 57 56 55          Subjective/Behavioral:Patient arrived on-time and attended well to session. Provided /m/ sentenced and voiced vs. Voiceless words and sentences for HEP.    Short-Term Goals:  Patient will use diaphragmatic breathing in conjunction with voicing at the sound prolongation, word, and phrase level with 90% accuracy in 8 weeks.   Patient used clavicular breathing occasionally during session; redirected patient to diaphragmatic breathing for exercises with clinician demonstration.    Patient will demonstrate the use of relaxation exercises and diaphragmatic breathing to facilitate reduced laryngeal tension and improved vocal quality, to be achieved in 8 weeks.    Semi-Occluded Vocal Tract Exercises  To target relaxed laryngeal posture and coordination between phonation and respiration, the following activities were completed using principles of SOVTE.     Straw only vibration/singing  Sustained voicing through straw. Completed over 2 reps 8-12s.   Short/small pitch glides, approx. 4 notes. Completed over 3-5 reps. -IMPROVEMENT  Long/full pitch glides, lowest to highest pitches. Completed over 2 reps, 3 notes. -IMPROVEMENT  Humming/Voicing familiar song through straw- Improvement as patient was able to complete ~50% of song during this task vs. 25% at previous session.    Patient will learn and apply resonance  "techniques at the sound, word, sentence, and paragraph levels with min verbal cues to facilitate improved vocal quality, to be achieved in 8 weeks.  To target forward focus: Patient practiced humming /m/ sound and then combining it with a vowel sound (e.g. ah, ay, ee, oo) to feel vibration in nose and hard palate.  Patient practiced this over 10 trials with approx. 75% accuracy.     Patient demonstrated some difficulty to feel forward focus \"buzz\". Educated on strategies such as forward vs. Negative practice, hands on nose, tongue behind bottom teeth, and tongue out position.    To target forward focus: Patient practiced reading single syllable /m/ words and sentences by humming first to achieve nasal resonance.  Patient read words with a \"sing-song,\" gentle quality with approx. 75% accuracy.   Patient read words 3X, fading the \"sing-song\" quality from their voice each repetition.  Patient completed this task with approx. 90% accuracy.     To target forward focus: Patient stated sentences with voiced vs. Voiceless words and sentences targeting nasal resonance.  Patient stated sentences with approx. 60% accuracy independently, increasing to 80% with repetition.     Patient will be educated on vocal hygiene and demonstrate understanding of recommendations to facilitate increased quality of voice, to be achieved in 8 weeks.   Continued education on vocal hygiene techniques. Patient verbalizes agreement/understanding.    Patient will increase MPT to >15 seconds, to be achieved by discharge.  Not addressed.    Other:Reviewed testing and plan of care with patient.Patient is in agreement with POC at this time.  Recommendations:Continue with Plan of Care  "

## 2024-10-24 ENCOUNTER — OFFICE VISIT (OUTPATIENT)
Dept: SPEECH THERAPY | Facility: CLINIC | Age: 44
End: 2024-10-24
Payer: COMMERCIAL

## 2024-10-24 DIAGNOSIS — R49.0 DYSPHONIA: Primary | ICD-10-CM

## 2024-10-24 DIAGNOSIS — J38.3 POLYPOID DEGENERATION OF VOCAL CORDS: ICD-10-CM

## 2024-10-24 DIAGNOSIS — R49.9 UNSPECIFIED VOICE AND RESONANCE DISORDER: ICD-10-CM

## 2024-10-24 PROCEDURE — 92507 TX SP LANG VOICE COMM INDIV: CPT

## 2024-10-29 ENCOUNTER — OFFICE VISIT (OUTPATIENT)
Dept: SPEECH THERAPY | Facility: CLINIC | Age: 44
End: 2024-10-29
Payer: COMMERCIAL

## 2024-10-29 DIAGNOSIS — J38.3 POLYPOID DEGENERATION OF VOCAL CORDS: ICD-10-CM

## 2024-10-29 DIAGNOSIS — R49.0 DYSPHONIA: Primary | ICD-10-CM

## 2024-10-29 DIAGNOSIS — R49.9 UNSPECIFIED VOICE AND RESONANCE DISORDER: ICD-10-CM

## 2024-10-29 PROCEDURE — 92507 TX SP LANG VOICE COMM INDIV: CPT

## 2024-10-29 NOTE — PROGRESS NOTES
Speech Treatment Note    Today's date: 10/29/2024  Patient name: Priya Le  : 1980  MRN: [id number]  Referring provider: Nazario Mazariegos MD  Dx:   Encounter Diagnosis     ICD-10-CM    1. Dysphonia  R49.0       2. 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 Patient demonstrating improvement with variable carryover of forward focus into conversational speech.    Short-Term Goals:  Patient will use diaphragmatic breathing in conjunction with voicing at the sound prolongation, word, and phrase level with 90% accuracy in 8 weeks.   Patient used diaphragmatic breathing with ~85% accuracy throughout session.    Patient will demonstrate the use of relaxation exercises and diaphragmatic breathing to facilitate reduced laryngeal tension and improved vocal quality, to be achieved in 8 weeks.    Semi-Occluded Vocal Tract Exercises  To target relaxed laryngeal posture and coordination between phonation and respiration, the following activities were completed using principles of SOVTE.     Straw only vibration/singing  Sustained voicing through straw. Completed over 2 reps 13s. -IMPROVEMENT  Short/small pitch glides, approx. 3 notes. Completed over 6-7 reps. -IMPROVEMENT  Long/full pitch glides, lowest to highest pitches. Completed over 2 reps, 3 notes.  Humming/Voicing familiar song through straw- Improvement as patient was able to complete ~75% of song during this task vs. 50% at previous session. -IMPROVEMENT     Fricative SOVT Exercises trialed with 50% accuracy.    Patient will learn and  apply resonance techniques at the sound, word, sentence, and paragraph levels with min verbal cues to facilitate improved vocal quality, to be achieved in 8 weeks.  To target forward focus: Patient practiced reading single syllable /m/ and /n/ words and sentences by humming first to achieve nasal resonance.  Patient read the 10 words with 80% accuracy and 10 sentences 1x with approx. 60% accuracy.     To target forward focus: Patient stated sentences with voiced vs. Voiceless words and sentences targeting nasal resonance.  Patient stated sentences with approx. 80% accuracy independently, increasing to 90% with repetition.     Patient created functional phrases with clinician and was able repeat them with forward focus after the therapist modeled each with ~80% accuracy.    Finally Melissa was able to read daily words/phrases (LSVT book list) with forward focus with 90% accuracy.    Patient will be educated on vocal hygiene and demonstrate understanding of recommendations to facilitate increased quality of voice, to be achieved in 8 weeks.   Not addressed.    Patient will increase MPT to >15 seconds, to be achieved by discharge.  Not addressed.    Other:Reviewed testing and plan of care with patient.Patient is in agreement with POC at this time.  Recommendations:Continue with Plan of Care

## 2024-10-30 ENCOUNTER — APPOINTMENT (OUTPATIENT)
Dept: SPEECH THERAPY | Facility: CLINIC | Age: 44
End: 2024-10-30
Payer: COMMERCIAL

## 2024-10-31 ENCOUNTER — APPOINTMENT (OUTPATIENT)
Dept: SPEECH THERAPY | Facility: CLINIC | Age: 44
End: 2024-10-31
Payer: COMMERCIAL

## 2024-11-05 ENCOUNTER — APPOINTMENT (OUTPATIENT)
Dept: SPEECH THERAPY | Facility: CLINIC | Age: 44
End: 2024-11-05
Payer: COMMERCIAL

## 2024-11-07 ENCOUNTER — OFFICE VISIT (OUTPATIENT)
Dept: SPEECH THERAPY | Facility: CLINIC | Age: 44
End: 2024-11-07
Payer: COMMERCIAL

## 2024-11-07 DIAGNOSIS — R49.0 DYSPHONIA: Primary | ICD-10-CM

## 2024-11-07 DIAGNOSIS — J38.3 POLYPOID DEGENERATION OF VOCAL CORDS: ICD-10-CM

## 2024-11-07 DIAGNOSIS — R49.9 UNSPECIFIED VOICE AND RESONANCE DISORDER: ICD-10-CM

## 2024-11-07 PROCEDURE — 92507 TX SP LANG VOICE COMM INDIV: CPT

## 2024-11-07 NOTE — PROGRESS NOTES
Speech Treatment Note    Today's date: 2024  Patient name: Priya Le  : 1980  MRN: [id number]  Referring provider: Nazario Mazariegos MD  Dx:   No diagnosis found.             Visit Tracking:  POC   Expires Auth Expiration Date ST Visit Limit   2024 N/a 60 PCY (ST, OT, PT)              Visit/Unit Tracking:  Auth Status Date 9/25 9/30 10/10 10/17 10/24 10/29 11/7      Not required Used 1 2 3 4 5 6 7        Remaining 59 58 57 56 55 54 53        Subjective/Behavioral:Patient arrived on-time and attended well to session. Patient demonstrating improvement with variable carryover of forward focus into conversational speech.    Short-Term Goals:  Patient will use diaphragmatic breathing in conjunction with voicing at the sound prolongation, word, and phrase level with 90% accuracy in 8 weeks.   Patient used diaphragmatic breathing with ~85% accuracy throughout session.    Patient will demonstrate the use of relaxation exercises and diaphragmatic breathing to facilitate reduced laryngeal tension and improved vocal quality, to be achieved in 8 weeks.  Semi-Occluded Vocal Tract Exercises  To target relaxed laryngeal posture and coordination between phonation and respiration, the following activities were completed using principles of SOVTE.    1. Fricative SOVT Exercises trialed with 90% accuracy.    Patient will learn and apply resonance techniques at the sound, word, sentence, and paragraph levels with min verbal cues to facilitate improved vocal quality, to be achieved in 8 weeks.  To target forward focus: Patient practiced reading single syllable nasal and voiced vs. Voiceless sentences by humming first to achieve nasal resonance.  Patient read 10 sentences 1x with approx. 90% accuracy.     Patient created functional phrases with clinician and was able repeat them with forward focus after the therapist modeled each with ~80% accuracy, increasing to 100% with 1 repetition.    Melissa was able to  read daily phrases (Reading comp list-Aphasia book) with forward focus with 80% accuracy and extended to longer sentences with 100% accuracy.    Conversation Training Therapy (CTT) is a voice therapy approach that uses conversation to treat voice disorders. This approach focuses vocal awareness and production in conversation. The goal of CTT is to achieve generalization of the target voice technique in everyday conversational voice as quickly as possible. Melissa was able to complete CTT exercises: 1 minute of resonant voice use and 30 seconds off with back focus. She completed this task for 5.5 minutes with ~75% accuracy.    Patient will be educated on vocal hygiene and demonstrate understanding of recommendations to facilitate increased quality of voice, to be achieved in 8 weeks.   Not addressed.    Patient will increase MPT to >15 seconds, to be achieved by discharge.  Not addressed.    Other:Reviewed testing and plan of care with patient.Patient is in agreement with POC at this time.  Recommendations:Continue with Plan of Care

## 2024-11-12 ENCOUNTER — APPOINTMENT (OUTPATIENT)
Dept: SPEECH THERAPY | Facility: CLINIC | Age: 44
End: 2024-11-12
Payer: COMMERCIAL

## 2024-11-14 ENCOUNTER — OFFICE VISIT (OUTPATIENT)
Dept: SPEECH THERAPY | Facility: CLINIC | Age: 44
End: 2024-11-14
Payer: COMMERCIAL

## 2024-11-14 DIAGNOSIS — J38.3 POLYPOID DEGENERATION OF VOCAL CORDS: ICD-10-CM

## 2024-11-14 DIAGNOSIS — R49.0 DYSPHONIA: Primary | ICD-10-CM

## 2024-11-14 DIAGNOSIS — R49.9 UNSPECIFIED VOICE AND RESONANCE DISORDER: ICD-10-CM

## 2024-11-14 PROCEDURE — 92507 TX SP LANG VOICE COMM INDIV: CPT

## 2024-11-14 NOTE — PROGRESS NOTES
"Speech Treatment Note    Today's date: 2024  Patient name: Priya Le  : 1980  MRN: [id number]  Referring provider: Nazario Mazariegos MD  Dx:   Encounter Diagnosis     ICD-10-CM    1. Dysphonia  R49.0       2. 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 Patient demonstrating improvement with variable carryover of forward focus into conversational speech.    Short-Term Goals:  Patient will use diaphragmatic breathing in conjunction with voicing at the sound prolongation, word, and phrase level with 90% accuracy in 8 weeks.   The patient completed the following vocal function exercises without laryngeal tension (based on Dr. Kulwant Pizarro's protocol), designed to balance laryngeal musculature, breath flow for phonation, and supraglottic placement of the tone.     Warm Up: Patient sustained /i/ for 4 seconds, using forward focus.  Stretching: Patient was able to demonstrate pitch glide from lowest note to highest on the word knoll. GOAL = no voice breaks. Completed over 3 trials.   Calixto: Patient was able to demonstrate pitch glide from highest note to lowest note on the word “knoll\". GOAL = no voice breaks. Completed over 3 trials.  Voice Exercises: Patient started with /OL/ sound , open throat, and tight lips, feel buzzing in cheeks and lips using forward focus. The following notes were elicited using a pitch pipe (C-D-E-F-G). Completed each note over 2 trials. mod cues needed. - 130-190 Hz    Some clavicular breathing observed during this task.    Patient " will demonstrate the use of relaxation exercises and diaphragmatic breathing to facilitate reduced laryngeal tension and improved vocal quality, to be achieved in 8 weeks.  Semi-Occluded Vocal Tract Exercises  To target relaxed laryngeal posture and coordination between phonation and respiration, the following activities were completed using principles of SOVTE.    1. Fricative SOVT Exercises trialed with 100% accuracy.    Patient will learn and apply resonance techniques at the sound, word, sentence, and paragraph levels with min verbal cues to facilitate improved vocal quality, to be achieved in 8 weeks.  To target forward focus: Patient practiced reading single syllable nasal and voiced vs. Voiceless sentences by humming first to achieve nasal resonance.  Patient read 10 sentences 1x with approx. 80% accuracy.     Patient created functional phrases with clinician and was able repeat them with forward focus after the therapist modeled each with 90% accuracy, increasing to 100% with 1 repetition.    Melissa was able to read daily family sentences phrases (LSVT hierarchy site) with forward focus with 70% accuracy and extended to longer sentences with 100% accuracy.    Conversation Training Therapy (CTT) is a voice therapy approach that uses conversation to treat voice disorders. This approach focuses vocal awareness and production in conversation. The goal of CTT is to achieve generalization of the target voice technique in everyday conversational voice as quickly as possible. Melissa was able to complete CTT exercises: 1.5 minute of resonant voice use and 30 seconds off with back focus with ~80% accuracy.    Patient will be educated on vocal hygiene and demonstrate understanding of recommendations to facilitate increased quality of voice, to be achieved in 8 weeks.   Not addressed.    Patient will increase MPT to >15 seconds, to be achieved by discharge.  Not addressed.    Other:Reviewed testing and plan of care with  patient.Patient is in agreement with POC at this time.  Recommendations:Continue with Plan of Care

## 2024-11-19 ENCOUNTER — APPOINTMENT (OUTPATIENT)
Dept: SPEECH THERAPY | Facility: CLINIC | Age: 44
End: 2024-11-19
Payer: COMMERCIAL

## 2024-11-21 ENCOUNTER — APPOINTMENT (OUTPATIENT)
Dept: SPEECH THERAPY | Facility: CLINIC | Age: 44
End: 2024-11-21
Payer: COMMERCIAL

## 2024-11-26 ENCOUNTER — APPOINTMENT (OUTPATIENT)
Dept: SPEECH THERAPY | Facility: CLINIC | Age: 44
End: 2024-11-26
Payer: COMMERCIAL

## 2024-11-27 ENCOUNTER — EVALUATION (OUTPATIENT)
Dept: SPEECH THERAPY | Facility: CLINIC | Age: 44
End: 2024-11-27
Payer: COMMERCIAL

## 2024-11-27 DIAGNOSIS — J38.3 POLYPOID DEGENERATION OF VOCAL CORDS: ICD-10-CM

## 2024-11-27 DIAGNOSIS — R49.0 DYSPHONIA: Primary | ICD-10-CM

## 2024-11-27 DIAGNOSIS — R49.9 UNSPECIFIED VOICE AND RESONANCE DISORDER: ICD-10-CM

## 2024-11-27 PROCEDURE — 92524 BEHAVRAL QUALIT ANALYS VOICE: CPT

## 2024-11-27 PROCEDURE — 92507 TX SP LANG VOICE COMM INDIV: CPT

## 2024-11-27 NOTE — PROGRESS NOTES
"Speech-Language Pathology Re-Evaluation    Today's date: 2024   Patient’s name: Priya Le  : 1980  MRN: [id number]  Safety measures: n/a  Referring provider: Nazario Mazariegos MD    Encounter Diagnosis     ICD-10-CM    1. Dysphonia  R49.0       2. 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 Sound prolongation of \"ah\" decreased slightly in length, but quality of sound improved. During subjective testing today, patient had a decline in VHI score. This initial decline likely seen from initial evaluation due to increased awareness of vocal quality and its impact on daily living. Over the past certification period, Patient has done well with diaphragmatic breathing, SOVTE, and implementation of cervical/shoulder stretches in therapy and as part of HEP. Patient shows progress with Vocal Function Exercises increasing sound prolongation times which maintaining forward focus and ability to alter pitch in a titrated sequence. Patient shows ability complete tasks of increased complexity (e.g. syllables, words, sentences) with use of forward resonance and would continue to benefit from RVT and CTT to increase carryover of this strategy into basic and more complex daily speaking tasks. At this time, it is recommended that Patient continue to be seen for skilled outpatient Speech Therapy 1X a week to target the above mentioned goals to increase carryover of trained tasks into conversational speech.  Prognosis is good based on strong patient motivation.     Short Term Goals:      Patient will use diaphragmatic breathing in conjunction with voicing at the sound prolongation, word, and phrase level with 90% " accuracy in 8 weeks. -GOAL MET  Patient will demonstrate the use of relaxation exercises and diaphragmatic breathing to facilitate reduced laryngeal tension and improved vocal quality, to be achieved in 8 weeks. -PARTIALLY MET  Patient will learn and apply resonance techniques at the sound, word, sentence, and paragraph levels with min verbal cues to facilitate improved vocal quality, to be achieved in 8 weeks.-PARTIALLY MET  Patient will be educated on vocal hygiene and demonstrate understanding of recommendations to facilitate increased quality of voice, to be achieved in 8 weeks.-GOAL MET  Patient will increase MPT to >15 seconds, to be achieved by discharge.-PARTIALLY MET     Long-term goals:  Patient will improve vocal quality for increased functional communication by discharge. -PARTIALLY MET      Plan:  Patient would benefit from outpatient skilled Speech Therapy services: Voice therapy    Frequency: 1x weekly  Duration: 2 months    Intervention certification from: 11/27/2024  Intervention certification to: 1/27/2025      Subjective:   Patient is a 44 y.o. 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    Patient's goal(s): decrease effort with trained skills    Pain: Absent      Objective (testing):  VOICE RE-EVALUATION:  -Updates: Patient with effort patient feels she can see a difficerent       “IE” (below) indicates the scores from the prior evaluation (9/25/24).     Patient Self-Ratings:  -The Voice Handicap Index (VHI) is a self-administered, 30-item outcomes instrument to quantify patients' perception of their voice handicap. It is a list of statements that many people have used to describe their voices and the effects of their voices on their lives. Patient indicated how frequently she has the same experience using a rating point scale (“never” = 0, “almost never” = 1, “sometimes” = 2, “almost  "always” = 3, and “always” = 4). Patient obtained a score of 8/120, indicating a self-perceived impairment level of mild. (see scanned document) (RE: mild = NO CHANGE)     Subscale: Score: Self-Perceived Impairment Level: IE: Status:   Physical 8/40 Mild 2 DECLINE   Functional 0/40 Mild 2 IMPROVEMENT   Emotional 0/40 Mild 0 NO CHANGE          TOTAL 8/120 Mild 4 DECLINE     Objective Measurements/Voice Parameters:  RESPIRATORY EFFICIENCY:   -S:Z Ratio Task: Patient was instructed to sustain the sounds /s/ and /z/ to examine the coordination and efficiency of respiration and voice production. Normative data suggests that adults can prolong these sounds for 20-25 seconds. Ratios of 1.4 and above are consistent with laryngeal inefficiency, and ratios of 2.0 and above are suggestive of vocal fold pathology. Patient's S:Z ratio was 0.81 (13.82 sec : 17.06 sec). (RE:  1.08; 10.89 sec : 10.07 sec) = IMPROVEMENT)     -Maximum Phonation Time: Patient was instructed to sustain /a/ to measure respiratory and laryngeal coordination and efficiency. Adults are typically able to prolong vowels sound for 15-20 seconds. Reduced MPT may suggest poor respiratory support, or poor medial glottal closure. Patient's MPT was 10.8s seconds. (IE: 11.01s = DECLINE)       MEASURES OF PITCH:    MEASURES OF PITCH:  -Habitual Pitch: Patient was instructed to engage in two different speaking tasks (counting and reading) to calculate the pitch she uses most frequently.     Task: Min-Max Range (Hz) Normative Data:   Speaking (counting 1-10) 126-140 Hz  (180 Hz -250 Hz)   Reading (\"[place]”) 105-149 Hz  (180 Hz -250 Hz)       -Maximal Phonational Frequency Range: Patient was instructed to voice from lowest to highest notes.     Task Min-Max Range (Hz) Normative Data:   Gliding 111 Hz-238 Hz 134 Hz-895 Hz     Treatment:  Short-Term Goals:  Patient will use diaphragmatic breathing in conjunction with voicing at the sound prolongation, word, and " "phrase level with 90% accuracy in 8 weeks.   The patient completed the following vocal function exercises without laryngeal tension (based on Dr. Kulwant Pizarro's protocol), designed to balance laryngeal musculature, breath flow for phonation, and supraglottic placement of the tone.     Warm Up: Patient sustained /i/ for 7 seconds, using forward focus.  Stretching: Patient was able to demonstrate pitch glide from lowest note to highest on the word knoll. GOAL = no voice breaks. Completed over 3 trials. -181Hz  Calixto: Patient was able to demonstrate pitch glide from highest note to lowest note on the word “knoll\". GOAL = no voice breaks. Completed over 3 trials.-103  Voice Exercises: Patient started with /OL/ sound , open throat, and tight lips, feel buzzing in cheeks and lips using forward focus. The following notes were elicited using a pitch pipe (C-D-E-F-G). Completed each note over 1 trials. mod cues needed. - 120-235 Hz    Patient will learn and apply resonance techniques at the sound, word, sentence, and paragraph levels with min verbal cues to facilitate improved vocal quality, to be achieved in 8 weeks.  To target forward focus: Patient practiced reading single syllable nasal and voiced vs. Voiceless sentences by humming first to achieve nasal resonance.  Patient read 10 sentences 1x with approx. 80% accuracy- same as previous session.    Patient created functional phrases with clinician and was able repeat them with forward focus after the therapist modeled each with 90% accuracy, increasing to 100% with 1 repetition.- same as previous session.    Melissa was able to read daily dog sentences phrases ([facility] hierarchy site) with forward focus with 90% accuracy-improvement.    Conversation Training Therapy (CTT) is a voice therapy approach that uses conversation to treat voice disorders. This approach focuses vocal awareness and production in conversation. The goal of CTT is to achieve generalization of the " target voice technique in everyday conversational voice as quickly as possible. Melissa was able to complete CTT exercises: 2.5 minute of resonant voice use and 30 seconds off with back focus with 90% accuracy.    Visit Tracking:  POC   Expires Auth Expiration Date ST Visit Limit   11/25/2024 N/a 60 PCY (ST, OT, PT)              Visit/Unit Tracking:  Auth Status Date 9/25 9/30 10/10 10/17 10/24 10/29 11/7 11/14     Not required Used 1 2 3 4 5 6 7 8       Remaining 59 58 57 56 55 54 53 52         Intervention comments:  Discussed POC with patient; patient in agreement.

## 2024-12-04 NOTE — PROGRESS NOTES
"Speech Treatment Note    Today's date: 2024  Patient name: Priya Le  : 1980  MRN: [id number]  Referring provider: Nazario Mazariegos MD  Dx:   Encounter Diagnosis     ICD-10-CM    1. Dysphonia  R49.0       2. 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     Short-Term Goals:    Patient will demonstrate the use of relaxation exercises and diaphragmatic breathing to facilitate reduced laryngeal tension and improved vocal quality, to be achieved in 8 weeks.    The patient completed the following vocal function exercises without laryngeal tension (based on Dr. Kulwant Pizarro's protocol), designed to balance laryngeal musculature, breath flow for phonation, and supraglottic placement of the tone.     Warm Up: Patient sustained /i/ for 7.3 seconds, using forward focus.  Stretching: Patient was able to demonstrate pitch glide from lowest note to highest on the word knoll. GOAL = no voice breaks. Completed over 3 trials. - highest pitch 190 Hz  Calixto: Patient was able to demonstrate pitch glide from highest note to lowest note on the word “knoll\". GOAL = no voice breaks. Completed over 3 trials.-lowest pitch 119 Hz  Voice Exercises: Patient started with /OL/ sound , open throat, and tight lips, feel buzzing in cheeks and lips using forward focus. The following notes were elicited using a pitch pipe (C-D-E-F-G). Completed each note over 2 trials. mod cues needed: 123-230 Hz    Some clavicular breathing observed during this task.    Patient will learn " and apply resonance techniques at the sound, word, sentence, and paragraph levels with min verbal cues to facilitate improved vocal quality, to be achieved in 8 weeks.  To target forward focus: Patient practiced reading single syllable nasal and voiced vs. Voiceless and /m/ sentences by humming first to achieve nasal resonance.  Patient read 10 sentences 1x with approx. 80% accuracy.     Patient created functional phrases with clinician and was able repeat them with forward focus after the therapist modeled each with 90% accuracy, increasing to 100% with 1 repetition.    Melissa was able to read daily vacation paragraphs ([facility] hierarchy site) with forward focus with 75% accuracy.    Conversation Training Therapy (CTT) is a voice therapy approach that uses conversation to treat voice disorders. This approach focuses vocal awareness and production in conversation. The goal of CTT is to achieve generalization of the target voice technique in everyday conversational voice as quickly as possible. Melissa was able to complete CTT exercises: 2.25 minute of resonant voice use and 30 seconds off with back focus with ~85% accuracy.    Patient will increase MPT to >15 seconds, to be achieved by discharge.  Not addressed.    Other:Reviewed testing and plan of care with patient.Patient is in agreement with POC at this time.  Recommendations:Continue with Plan of Care

## 2024-12-05 ENCOUNTER — OFFICE VISIT (OUTPATIENT)
Dept: SPEECH THERAPY | Facility: CLINIC | Age: 44
End: 2024-12-05
Payer: COMMERCIAL

## 2024-12-05 DIAGNOSIS — R49.0 DYSPHONIA: Primary | ICD-10-CM

## 2024-12-05 DIAGNOSIS — J38.3 POLYPOID DEGENERATION OF VOCAL CORDS: ICD-10-CM

## 2024-12-05 DIAGNOSIS — R49.9 UNSPECIFIED VOICE AND RESONANCE DISORDER: ICD-10-CM

## 2024-12-05 PROCEDURE — 92507 TX SP LANG VOICE COMM INDIV: CPT

## 2024-12-10 NOTE — PROGRESS NOTES
"Speech Treatment Note    Today's date: 2024  Patient name: Priya Le  : 1980  MRN: [id number]  Referring provider: Nazario Mazariegos MD  Dx:   Encounter Diagnosis     ICD-10-CM    1. Dysphonia  R49.0       2. 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     Short-Term Goals:    Patient will demonstrate the use of relaxation exercises and diaphragmatic breathing to facilitate reduced laryngeal tension and improved vocal quality, to be achieved in 8 weeks.    Semi-Occluded Vocal Tract Exercises  To target relaxed laryngeal posture and coordination between phonation and respiration, the following activities were completed using principles of SOVTE.      Straw only vibration/singing  Sustained voicing through straw. Completed over 3 reps 12s.  Short/small pitch glides, approx. 3 notes. Completed over 7-8 reps. -IMPROVEMENT  Long/full pitch glides, lowest to highest pitches. Completed over 2 reps, 4 notes.    The patient completed the following vocal function exercises without laryngeal tension (based on Dr. Kulwant Pizarro's protocol), designed to balance laryngeal musculature, breath flow for phonation, and supraglottic placement of the tone.     Warm Up: Patient sustained /i/ for 8.59 seconds, using forward focus.-improvement  Stretching: Patient was able to demonstrate pitch glide from lowest note to highest on the word knoll. GOAL = no voice breaks. Completed over 3 trials. - highest pitch 171 Hz  Calixto: Patient was able to demonstrate pitch glide from highest note to lowest note on the word “knoll\". GOAL = no " voice breaks. Completed over 3 trials.-lowest pitch 113 Hz  Voice Exercises: Patient started with /OL/ sound , open throat, and tight lips, feel buzzing in cheeks and lips using forward focus. The following notes were elicited using a pitch pipe (C-D-E-F-G). Completed each note over 2 trials. mod cues needed: 133-259 Hz-improvement (43s)      Patient will learn and apply resonance techniques at the sound, word, sentence, and paragraph levels with min verbal cues to facilitate improved vocal quality, to be achieved in 8 weeks.  To target forward focus: Patient practiced reading single syllable nasal and voiced vs. Voiceless and /m/ sentences by humming first to achieve nasal resonance.  Patient read 10 sentences 1x with approx. 80% accuracy.     Patient created functional phrases with clinician and was able repeat them with forward focus after the therapist modeled each with 100% accuracy-IMPROVEMENT    Conversation Training Therapy (CTT) is a voice therapy approach that uses conversation to treat voice disorders. This approach focuses vocal awareness and production in conversation. The goal of CTT is to achieve generalization of the target voice technique in everyday conversational voice as quickly as possible. Melissa was able to complete CTT exercises: 2.25 minute of resonant voice use and 30 seconds off with back focus with ~85% accuracy.    Patient will increase MPT to >15 seconds, to be achieved by discharge.  Not addressed.    Other:Reviewed testing and plan of care with patient.Patient is in agreement with POC at this time.  Recommendations:Continue with Plan of Care

## 2024-12-11 ENCOUNTER — OFFICE VISIT (OUTPATIENT)
Dept: SPEECH THERAPY | Facility: CLINIC | Age: 44
End: 2024-12-11
Payer: COMMERCIAL

## 2024-12-11 DIAGNOSIS — R49.0 DYSPHONIA: Primary | ICD-10-CM

## 2024-12-11 DIAGNOSIS — R49.9 UNSPECIFIED VOICE AND RESONANCE DISORDER: ICD-10-CM

## 2024-12-11 DIAGNOSIS — J38.3 POLYPOID DEGENERATION OF VOCAL CORDS: ICD-10-CM

## 2024-12-11 PROCEDURE — 92507 TX SP LANG VOICE COMM INDIV: CPT

## 2024-12-12 ENCOUNTER — APPOINTMENT (OUTPATIENT)
Dept: SPEECH THERAPY | Facility: CLINIC | Age: 44
End: 2024-12-12
Payer: COMMERCIAL

## 2024-12-19 ENCOUNTER — APPOINTMENT (OUTPATIENT)
Dept: SPEECH THERAPY | Facility: CLINIC | Age: 44
End: 2024-12-19
Payer: COMMERCIAL

## 2024-12-20 ENCOUNTER — OFFICE VISIT (OUTPATIENT)
Dept: SPEECH THERAPY | Facility: CLINIC | Age: 44
End: 2024-12-20
Payer: COMMERCIAL

## 2024-12-20 DIAGNOSIS — J38.3 POLYPOID DEGENERATION OF VOCAL CORDS: ICD-10-CM

## 2024-12-20 DIAGNOSIS — R49.9 UNSPECIFIED VOICE AND RESONANCE DISORDER: ICD-10-CM

## 2024-12-20 DIAGNOSIS — R49.0 DYSPHONIA: Primary | ICD-10-CM

## 2024-12-20 PROCEDURE — 92507 TX SP LANG VOICE COMM INDIV: CPT

## 2024-12-20 NOTE — PROGRESS NOTES
Speech Treatment Note/Discharge Summary    Today's date: 2024  Patient name: Priya Le  : 1980  MRN: [id number]  Referring provider: Nazario Mazariegos MD  Dx:   Encounter Diagnosis     ICD-10-CM    1. Dysphonia  R49.0       2. 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 Patient improved MPT and S/Z ratio. Over the past certification period, patient has done well with diaphragmatic breathing, VFE, SOVTE, and RVT in therapy and as part of HEP. Patient shows ability complete tasks of increased complexity (e.g. syllables, words, sentences) with use of forward resonance. Educated patient on discharge and recommendations to continue use of forward focus with speaking tasks, and completion of SOVTEs as she continues to increase vocal demand. Patient stated agreement/understanding with d/c. Contact information provided to the patient to call with any  questions/concerns.     Subjective/Behavioral:Patient arrived on-time and attended well to session.     Short-Term Goals:    Patient will demonstrate the use of relaxation exercises and diaphragmatic breathing to facilitate reduced laryngeal tension and improved vocal quality, to be achieved in 8 weeks.-GOAL MET    Semi-Occluded Vocal Tract Exercises  To target relaxed laryngeal posture and coordination between phonation and respiration, the following activities were completed using principles of SOVTE.      Straw only vibration/singing  Sustained voicing through straw. Completed over 2 reps 13s.  Short/small pitch glides, approx. 3  "notes. Completed over 7 reps. -IMPROVEMENT  Long/full pitch glides, lowest to highest pitches. Completed over 3 reps, 5 notes.    The patient completed the following vocal function exercises without laryngeal tension (based on Dr. Kulwant Pizarro's protocol), designed to balance laryngeal musculature, breath flow for phonation, and supraglottic placement of the tone.     Warm Up: Patient sustained /i/ for 9.4 seconds, using forward focus.  Stretching: Patient was able to demonstrate pitch glide from lowest note to highest on the word knoll. GOAL = no voice breaks. Completed over 2 trials. - highest pitch 258 Hz  Calixto: Patient was able to demonstrate pitch glide from highest note to lowest note on the word “knoll\". GOAL = no voice breaks. Completed over 2 trials.-lowest pitch 103 Hz  Voice Exercises: Patient started with /OL/ sound , open throat, and tight lips, feel buzzing in cheeks and lips using forward focus. The following notes were elicited using a pitch pipe (C-D-E-F-G). Completed each note over 1 trials. mod cues needed: 126-307 Hz-improvement (26s)      Patient will learn and apply resonance techniques at the sound, word, sentence, and paragraph levels with min verbal cues to facilitate improved vocal quality, to be achieved in 8 weeks.GOAL MET  To target forward focus: Patient practiced reading single syllable nasal and voiced vs. Voiceless and /m/ sentences by humming first to achieve nasal resonance. Then read sentences 2 more times increasing pace. Patient read 10 sentences 3x with approx. 90% accuracy.     Patient will increase MPT to >15 seconds, to be achieved by discharge.-PARTIALLY MET  Objective Measurements/Voice Parameters:  RESPIRATORY EFFICIENCY:   -S:Z Ratio Task: Patient was instructed to sustain the sounds /s/ and /z/ to examine the coordination and efficiency of respiration and voice production. Normative data suggests that adults can prolong these sounds for 20-25 seconds. Ratios of " 1.4 and above are consistent with laryngeal inefficiency, and ratios of 2.0 and above are suggestive of vocal fold pathology.   12/20/24:Patient's S:Z ratio was 1.05 (12.81 sec : 12.19sec).     -Maximum Phonation Time: Patient was instructed to sustain /a/ to measure respiratory and laryngeal coordination and efficiency. Adults are typically able to prolong vowels sound for 15-20 seconds. Reduced MPT may suggest poor respiratory support, or poor medial glottal closure. 12/20/24:Patient's MPT was 12.2s seconds.     MEASURES OF PITCH:  -Habitual Pitch: Patient was instructed to engage in two different speaking tasks (counting and reading) to calculate the pitch she uses most frequently.     Task: 12/20/24 11/27/24 Normative Data:   Speaking (counting 1-10) 120-198 Hz 126-140 Hz  (180 Hz -250 Hz)       -Maximal Phonational Frequency Range: Patient was instructed to voice from lowest to highest notes.     Task 12/20/24 11/27/24 Normative Data:   Gliding 108-250 Hz 111 Hz-238 Hz 134 Hz-895 Hz       Other:Reviewed testing and plan of care with patient.Patient is in agreement with POC at this time.  Recommendations:Continue with Plan of Care

## 2024-12-30 ENCOUNTER — APPOINTMENT (OUTPATIENT)
Dept: SPEECH THERAPY | Facility: CLINIC | Age: 44
End: 2024-12-30
Payer: COMMERCIAL

## (undated) DEVICE — HEAVY DUTY TABLE COVER: Brand: CONVERTORS

## (undated) DEVICE — TUBING SMOKE EVAC W/FILTRATION DEVICE PLUMEPORT ACTIV

## (undated) DEVICE — SUT MONOCRYL 4-0 PS-2 18 IN Y496G

## (undated) DEVICE — CYSTO TUBING TUR Y IRRIGATION

## (undated) DEVICE — PBT NON-ABSORBABLE RELOAD: Brand: V-LOC

## (undated) DEVICE — DRAPE LAPAROTOMY W/POUCHES

## (undated) DEVICE — MAYO STAND COVER: Brand: CONVERTORS

## (undated) DEVICE — VIAL DECANTER

## (undated) DEVICE — BETHLEHEM UNIVERSAL GYN LAP PK: Brand: CARDINAL HEALTH

## (undated) DEVICE — UTERINE MANIPULATOR RUMI 6.7 X 6 CM

## (undated) DEVICE — DRAPE SHEET THREE QUARTER

## (undated) DEVICE — ANTIBACTERIAL VIOLET BRAIDED (POLYGLACTIN 910), SYNTHETIC ABSORBABLE SUTURE: Brand: COATED VICRYL

## (undated) DEVICE — SYRINGE 50ML LL

## (undated) DEVICE — IRRIG ENDO FLO TUBING

## (undated) DEVICE — LIGASURE LAP SLR/DIV MARYLAND 5MM

## (undated) DEVICE — 1820 FOAM BLOCK NEEDLE COUNTER: Brand: DEVON

## (undated) DEVICE — CHLORAPREP HI-LITE 26ML ORANGE

## (undated) DEVICE — PLUMEPEN PRO 10FT

## (undated) DEVICE — TROCAR: Brand: KII® SLEEVE

## (undated) DEVICE — GLOVE PI ULTRA TOUCH SZ 6

## (undated) DEVICE — ENDO STITCH DEVICE 10 MM

## (undated) DEVICE — PREMIUM DRY TRAY LF: Brand: MEDLINE INDUSTRIES, INC.

## (undated) DEVICE — TROCAR: Brand: KII FIOS FIRST ENTRY

## (undated) DEVICE — SUT VICRYL 0 UR-6 27 IN J603H

## (undated) DEVICE — OCCLUDER COLPO-PNEUMO

## (undated) DEVICE — TOWEL SURG XR DETECT GREEN STRL RFD

## (undated) DEVICE — LIGHT HANDLE COVER SLEEVE DISP BLUE STELLAR

## (undated) DEVICE — MEDI-VAC YANK SUCT HNDL W/TPRD BULBOUS TIP: Brand: CARDINAL HEALTH

## (undated) DEVICE — INTENDED FOR TISSUE SEPARATION, AND OTHER PROCEDURES THAT REQUIRE A SHARP SURGICAL BLADE TO PUNCTURE OR CUT.: Brand: BARD-PARKER SAFETY BLADES SIZE 11, STERILE

## (undated) DEVICE — TUBING SUCTION 5MM X 12 FT

## (undated) DEVICE — TRAY FOLEY 16FR URIMETER SILICONE SURESTEP

## (undated) DEVICE — SPONGE 4 X 4 XRAY 16 PLY STRL LF RFD

## (undated) DEVICE — ADHESIVE SKIN HIGH VISCOSITY EXOFIN 1ML